# Patient Record
Sex: FEMALE | Race: WHITE | NOT HISPANIC OR LATINO | Employment: OTHER | URBAN - METROPOLITAN AREA
[De-identification: names, ages, dates, MRNs, and addresses within clinical notes are randomized per-mention and may not be internally consistent; named-entity substitution may affect disease eponyms.]

---

## 2017-06-01 ENCOUNTER — OFFICE VISIT - HEALTHEAST (OUTPATIENT)
Dept: ADDICTION MEDICINE | Facility: CLINIC | Age: 32
End: 2017-06-01

## 2017-06-01 DIAGNOSIS — F12.20 CANNABIS USE DISORDER, MODERATE, DEPENDENCE (H): ICD-10-CM

## 2017-06-01 DIAGNOSIS — F10.20 ALCOHOL USE DISORDER, SEVERE, DEPENDENCE (H): ICD-10-CM

## 2017-06-02 ENCOUNTER — AMBULATORY - HEALTHEAST (OUTPATIENT)
Dept: BEHAVIORAL HEALTH | Facility: CLINIC | Age: 32
End: 2017-06-02

## 2017-06-02 DIAGNOSIS — F10.20 SEVERE ALCOHOL USE DISORDER (H): ICD-10-CM

## 2017-06-05 ENCOUNTER — OFFICE VISIT - HEALTHEAST (OUTPATIENT)
Dept: ADDICTION MEDICINE | Facility: CLINIC | Age: 32
End: 2017-06-05

## 2017-06-05 DIAGNOSIS — F10.20 ALCOHOL USE DISORDER, SEVERE, DEPENDENCE (H): ICD-10-CM

## 2017-06-06 ENCOUNTER — OFFICE VISIT - HEALTHEAST (OUTPATIENT)
Dept: ADDICTION MEDICINE | Facility: CLINIC | Age: 32
End: 2017-06-06

## 2017-06-06 DIAGNOSIS — F10.20 ALCOHOL USE DISORDER, SEVERE, DEPENDENCE (H): ICD-10-CM

## 2017-06-07 ENCOUNTER — OFFICE VISIT - HEALTHEAST (OUTPATIENT)
Dept: ADDICTION MEDICINE | Facility: CLINIC | Age: 32
End: 2017-06-07

## 2017-06-07 DIAGNOSIS — F10.20 ALCOHOL USE DISORDER, SEVERE, DEPENDENCE (H): ICD-10-CM

## 2017-06-08 ENCOUNTER — OFFICE VISIT - HEALTHEAST (OUTPATIENT)
Dept: ADDICTION MEDICINE | Facility: CLINIC | Age: 32
End: 2017-06-08

## 2017-06-08 ENCOUNTER — AMBULATORY - HEALTHEAST (OUTPATIENT)
Dept: ADDICTION MEDICINE | Facility: CLINIC | Age: 32
End: 2017-06-08

## 2017-06-08 DIAGNOSIS — F10.20 ALCOHOL USE DISORDER, SEVERE, DEPENDENCE (H): ICD-10-CM

## 2017-06-09 ENCOUNTER — AMBULATORY - HEALTHEAST (OUTPATIENT)
Dept: ADDICTION MEDICINE | Facility: CLINIC | Age: 32
End: 2017-06-09

## 2017-06-09 ENCOUNTER — OFFICE VISIT - HEALTHEAST (OUTPATIENT)
Dept: ADDICTION MEDICINE | Facility: CLINIC | Age: 32
End: 2017-06-09

## 2017-06-09 DIAGNOSIS — F10.20 ALCOHOL USE DISORDER, SEVERE, DEPENDENCE (H): ICD-10-CM

## 2017-06-12 ENCOUNTER — OFFICE VISIT - HEALTHEAST (OUTPATIENT)
Dept: ADDICTION MEDICINE | Facility: CLINIC | Age: 32
End: 2017-06-12

## 2017-06-12 DIAGNOSIS — F10.20 ALCOHOL USE DISORDER, SEVERE, DEPENDENCE (H): ICD-10-CM

## 2017-06-13 ENCOUNTER — OFFICE VISIT - HEALTHEAST (OUTPATIENT)
Dept: ADDICTION MEDICINE | Facility: CLINIC | Age: 32
End: 2017-06-13

## 2017-06-13 DIAGNOSIS — F10.20 ALCOHOL USE DISORDER, SEVERE, DEPENDENCE (H): ICD-10-CM

## 2017-06-14 ENCOUNTER — OFFICE VISIT - HEALTHEAST (OUTPATIENT)
Dept: ADDICTION MEDICINE | Facility: CLINIC | Age: 32
End: 2017-06-14

## 2017-06-14 DIAGNOSIS — F10.20 ALCOHOL USE DISORDER, SEVERE, DEPENDENCE (H): ICD-10-CM

## 2017-06-15 ENCOUNTER — AMBULATORY - HEALTHEAST (OUTPATIENT)
Dept: ADDICTION MEDICINE | Facility: CLINIC | Age: 32
End: 2017-06-15

## 2017-06-15 ENCOUNTER — OFFICE VISIT - HEALTHEAST (OUTPATIENT)
Dept: ADDICTION MEDICINE | Facility: CLINIC | Age: 32
End: 2017-06-15

## 2017-06-15 DIAGNOSIS — F10.20 ALCOHOL USE DISORDER, SEVERE, DEPENDENCE (H): ICD-10-CM

## 2017-06-23 ENCOUNTER — COMMUNICATION - HEALTHEAST (OUTPATIENT)
Dept: ADDICTION MEDICINE | Facility: CLINIC | Age: 32
End: 2017-06-23

## 2017-06-23 ENCOUNTER — AMBULATORY - HEALTHEAST (OUTPATIENT)
Dept: ADDICTION MEDICINE | Facility: CLINIC | Age: 32
End: 2017-06-23

## 2017-06-26 ENCOUNTER — OFFICE VISIT - HEALTHEAST (OUTPATIENT)
Dept: ADDICTION MEDICINE | Facility: CLINIC | Age: 32
End: 2017-06-26

## 2017-06-26 DIAGNOSIS — F10.20 ALCOHOL USE DISORDER, SEVERE, DEPENDENCE (H): ICD-10-CM

## 2017-06-27 ENCOUNTER — OFFICE VISIT - HEALTHEAST (OUTPATIENT)
Dept: ADDICTION MEDICINE | Facility: CLINIC | Age: 32
End: 2017-06-27

## 2017-06-27 DIAGNOSIS — F10.20 ALCOHOL USE DISORDER, SEVERE, DEPENDENCE (H): ICD-10-CM

## 2017-06-30 ENCOUNTER — AMBULATORY - HEALTHEAST (OUTPATIENT)
Dept: ADDICTION MEDICINE | Facility: CLINIC | Age: 32
End: 2017-06-30

## 2017-06-30 ENCOUNTER — OFFICE VISIT - HEALTHEAST (OUTPATIENT)
Dept: ADDICTION MEDICINE | Facility: CLINIC | Age: 32
End: 2017-06-30

## 2017-06-30 DIAGNOSIS — F10.20 ALCOHOL USE DISORDER, SEVERE, DEPENDENCE (H): ICD-10-CM

## 2017-07-05 ENCOUNTER — OFFICE VISIT - HEALTHEAST (OUTPATIENT)
Dept: ADDICTION MEDICINE | Facility: CLINIC | Age: 32
End: 2017-07-05

## 2017-07-05 DIAGNOSIS — F10.20 ALCOHOL USE DISORDER, SEVERE, DEPENDENCE (H): ICD-10-CM

## 2017-07-06 ENCOUNTER — OFFICE VISIT - HEALTHEAST (OUTPATIENT)
Dept: ADDICTION MEDICINE | Facility: CLINIC | Age: 32
End: 2017-07-06

## 2017-07-06 DIAGNOSIS — F10.20 ALCOHOL USE DISORDER, SEVERE, DEPENDENCE (H): ICD-10-CM

## 2017-07-07 ENCOUNTER — AMBULATORY - HEALTHEAST (OUTPATIENT)
Dept: ADDICTION MEDICINE | Facility: CLINIC | Age: 32
End: 2017-07-07

## 2017-07-10 ENCOUNTER — AMBULATORY - HEALTHEAST (OUTPATIENT)
Dept: ADDICTION MEDICINE | Facility: CLINIC | Age: 32
End: 2017-07-10

## 2017-07-10 ENCOUNTER — OFFICE VISIT - HEALTHEAST (OUTPATIENT)
Dept: ADDICTION MEDICINE | Facility: CLINIC | Age: 32
End: 2017-07-10

## 2017-07-10 DIAGNOSIS — F10.20 ALCOHOL USE DISORDER, SEVERE, DEPENDENCE (H): ICD-10-CM

## 2017-07-11 ENCOUNTER — OFFICE VISIT - HEALTHEAST (OUTPATIENT)
Dept: ADDICTION MEDICINE | Facility: CLINIC | Age: 32
End: 2017-07-11

## 2017-07-11 DIAGNOSIS — F10.20 ALCOHOL USE DISORDER, SEVERE, DEPENDENCE (H): ICD-10-CM

## 2017-07-13 ENCOUNTER — COMMUNICATION - HEALTHEAST (OUTPATIENT)
Dept: ADDICTION MEDICINE | Facility: CLINIC | Age: 32
End: 2017-07-13

## 2017-07-14 ENCOUNTER — AMBULATORY - HEALTHEAST (OUTPATIENT)
Dept: ADDICTION MEDICINE | Facility: CLINIC | Age: 32
End: 2017-07-14

## 2017-07-14 ENCOUNTER — OFFICE VISIT - HEALTHEAST (OUTPATIENT)
Dept: ADDICTION MEDICINE | Facility: CLINIC | Age: 32
End: 2017-07-14

## 2017-07-14 DIAGNOSIS — F10.20 ALCOHOL USE DISORDER, SEVERE, DEPENDENCE (H): ICD-10-CM

## 2017-07-17 ENCOUNTER — OFFICE VISIT - HEALTHEAST (OUTPATIENT)
Dept: ADDICTION MEDICINE | Facility: CLINIC | Age: 32
End: 2017-07-17

## 2017-07-17 DIAGNOSIS — F10.20 ALCOHOL USE DISORDER, SEVERE, DEPENDENCE (H): ICD-10-CM

## 2017-07-18 ENCOUNTER — OFFICE VISIT - HEALTHEAST (OUTPATIENT)
Dept: ADDICTION MEDICINE | Facility: CLINIC | Age: 32
End: 2017-07-18

## 2017-07-18 DIAGNOSIS — F10.20 ALCOHOL USE DISORDER, SEVERE, DEPENDENCE (H): ICD-10-CM

## 2017-07-21 ENCOUNTER — OFFICE VISIT - HEALTHEAST (OUTPATIENT)
Dept: ADDICTION MEDICINE | Facility: CLINIC | Age: 32
End: 2017-07-21

## 2017-07-21 DIAGNOSIS — F10.20 ALCOHOL USE DISORDER, SEVERE, DEPENDENCE (H): ICD-10-CM

## 2017-07-23 ENCOUNTER — AMBULATORY - HEALTHEAST (OUTPATIENT)
Dept: ADDICTION MEDICINE | Facility: CLINIC | Age: 32
End: 2017-07-23

## 2017-07-25 ENCOUNTER — OFFICE VISIT - HEALTHEAST (OUTPATIENT)
Dept: ADDICTION MEDICINE | Facility: CLINIC | Age: 32
End: 2017-07-25

## 2017-07-25 DIAGNOSIS — F10.20 ALCOHOL USE DISORDER, SEVERE, DEPENDENCE (H): ICD-10-CM

## 2017-07-30 ENCOUNTER — AMBULATORY - HEALTHEAST (OUTPATIENT)
Dept: ADDICTION MEDICINE | Facility: CLINIC | Age: 32
End: 2017-07-30

## 2017-07-31 ENCOUNTER — AMBULATORY - HEALTHEAST (OUTPATIENT)
Dept: ADDICTION MEDICINE | Facility: CLINIC | Age: 32
End: 2017-07-31

## 2017-07-31 ENCOUNTER — OFFICE VISIT - HEALTHEAST (OUTPATIENT)
Dept: ADDICTION MEDICINE | Facility: CLINIC | Age: 32
End: 2017-07-31

## 2017-07-31 DIAGNOSIS — F10.20 ALCOHOL USE DISORDER, SEVERE, DEPENDENCE (H): ICD-10-CM

## 2017-08-01 ENCOUNTER — OFFICE VISIT - HEALTHEAST (OUTPATIENT)
Dept: ADDICTION MEDICINE | Facility: CLINIC | Age: 32
End: 2017-08-01

## 2017-08-01 DIAGNOSIS — F10.20 ALCOHOL USE DISORDER, SEVERE, DEPENDENCE (H): ICD-10-CM

## 2017-08-04 ENCOUNTER — OFFICE VISIT - HEALTHEAST (OUTPATIENT)
Dept: ADDICTION MEDICINE | Facility: CLINIC | Age: 32
End: 2017-08-04

## 2017-08-04 DIAGNOSIS — F10.20 ALCOHOL USE DISORDER, SEVERE, DEPENDENCE (H): ICD-10-CM

## 2017-08-06 ENCOUNTER — AMBULATORY - HEALTHEAST (OUTPATIENT)
Dept: ADDICTION MEDICINE | Facility: CLINIC | Age: 32
End: 2017-08-06

## 2017-08-08 ENCOUNTER — COMMUNICATION - HEALTHEAST (OUTPATIENT)
Dept: ADDICTION MEDICINE | Facility: CLINIC | Age: 32
End: 2017-08-08

## 2017-08-11 ENCOUNTER — OFFICE VISIT - HEALTHEAST (OUTPATIENT)
Dept: ADDICTION MEDICINE | Facility: CLINIC | Age: 32
End: 2017-08-11

## 2017-08-11 DIAGNOSIS — F10.20 ALCOHOL USE DISORDER, SEVERE, DEPENDENCE (H): ICD-10-CM

## 2017-08-13 ENCOUNTER — AMBULATORY - HEALTHEAST (OUTPATIENT)
Dept: ADDICTION MEDICINE | Facility: CLINIC | Age: 32
End: 2017-08-13

## 2017-08-14 ENCOUNTER — COMMUNICATION - HEALTHEAST (OUTPATIENT)
Dept: ADDICTION MEDICINE | Facility: CLINIC | Age: 32
End: 2017-08-14

## 2017-08-15 ENCOUNTER — OFFICE VISIT - HEALTHEAST (OUTPATIENT)
Dept: ADDICTION MEDICINE | Facility: CLINIC | Age: 32
End: 2017-08-15

## 2017-08-15 DIAGNOSIS — F10.20 ALCOHOL USE DISORDER, SEVERE, DEPENDENCE (H): ICD-10-CM

## 2017-08-17 ENCOUNTER — AMBULATORY - HEALTHEAST (OUTPATIENT)
Dept: ADDICTION MEDICINE | Facility: CLINIC | Age: 32
End: 2017-08-17

## 2017-08-22 ENCOUNTER — OFFICE VISIT - HEALTHEAST (OUTPATIENT)
Dept: ADDICTION MEDICINE | Facility: CLINIC | Age: 32
End: 2017-08-22

## 2017-08-22 DIAGNOSIS — F10.20 ALCOHOL USE DISORDER, SEVERE, DEPENDENCE (H): ICD-10-CM

## 2017-08-25 ENCOUNTER — AMBULATORY - HEALTHEAST (OUTPATIENT)
Dept: ADDICTION MEDICINE | Facility: CLINIC | Age: 32
End: 2017-08-25

## 2017-08-29 ENCOUNTER — COMMUNICATION - HEALTHEAST (OUTPATIENT)
Dept: ADDICTION MEDICINE | Facility: CLINIC | Age: 32
End: 2017-08-29

## 2017-09-01 ENCOUNTER — AMBULATORY - HEALTHEAST (OUTPATIENT)
Dept: ADDICTION MEDICINE | Facility: CLINIC | Age: 32
End: 2017-09-01

## 2017-09-01 ENCOUNTER — COMMUNICATION - HEALTHEAST (OUTPATIENT)
Dept: ADDICTION MEDICINE | Facility: CLINIC | Age: 32
End: 2017-09-01

## 2017-09-05 ENCOUNTER — COMMUNICATION - HEALTHEAST (OUTPATIENT)
Dept: ADDICTION MEDICINE | Facility: CLINIC | Age: 32
End: 2017-09-05

## 2017-09-07 ENCOUNTER — OFFICE VISIT - HEALTHEAST (OUTPATIENT)
Dept: ADDICTION MEDICINE | Facility: CLINIC | Age: 32
End: 2017-09-07

## 2017-09-07 DIAGNOSIS — F10.20 ALCOHOL USE DISORDER, SEVERE, DEPENDENCE (H): ICD-10-CM

## 2017-09-08 ENCOUNTER — AMBULATORY - HEALTHEAST (OUTPATIENT)
Dept: ADDICTION MEDICINE | Facility: CLINIC | Age: 32
End: 2017-09-08

## 2017-09-12 ENCOUNTER — COMMUNICATION - HEALTHEAST (OUTPATIENT)
Dept: ADDICTION MEDICINE | Facility: CLINIC | Age: 32
End: 2017-09-12

## 2017-09-14 ENCOUNTER — AMBULATORY - HEALTHEAST (OUTPATIENT)
Dept: ADDICTION MEDICINE | Facility: CLINIC | Age: 32
End: 2017-09-14

## 2017-09-23 ENCOUNTER — AMBULATORY - HEALTHEAST (OUTPATIENT)
Dept: ADDICTION MEDICINE | Facility: CLINIC | Age: 32
End: 2017-09-23

## 2017-10-02 ENCOUNTER — AMBULATORY - HEALTHEAST (OUTPATIENT)
Dept: ADDICTION MEDICINE | Facility: CLINIC | Age: 32
End: 2017-10-02

## 2018-05-04 ENCOUNTER — HOSPITAL ENCOUNTER (EMERGENCY)
Facility: CLINIC | Age: 33
Discharge: HOME | End: 2018-05-04
Attending: EMERGENCY MEDICINE | Admitting: EMERGENCY MEDICINE
Payer: COMMERCIAL

## 2018-05-04 VITALS
TEMPERATURE: 98.7 F | DIASTOLIC BLOOD PRESSURE: 75 MMHG | OXYGEN SATURATION: 98 % | SYSTOLIC BLOOD PRESSURE: 118 MMHG | HEART RATE: 98 BPM | RESPIRATION RATE: 20 BRPM

## 2018-05-04 DIAGNOSIS — O03.4 INCOMPLETE MISCARRIAGE: ICD-10-CM

## 2018-05-04 LAB
ABO + RH BLD: NORMAL
ABO + RH BLD: NORMAL
B-HCG SERPL-ACNC: 7125 IU/L (ref 0–5)
BASOPHILS # BLD AUTO: 0 10E9/L (ref 0–0.2)
BASOPHILS NFR BLD AUTO: 0.1 %
BLOOD BANK CMNT PATIENT-IMP: NORMAL
DIFFERENTIAL METHOD BLD: ABNORMAL
EOSINOPHIL # BLD AUTO: 0.1 10E9/L (ref 0–0.7)
EOSINOPHIL NFR BLD AUTO: 0.9 %
ERYTHROCYTE [DISTWIDTH] IN BLOOD BY AUTOMATED COUNT: 11.3 % (ref 10–15)
HCT VFR BLD AUTO: 37.9 % (ref 35–47)
HGB BLD-MCNC: 13.1 G/DL (ref 11.7–15.7)
IMM GRANULOCYTES # BLD: 0 10E9/L (ref 0–0.4)
IMM GRANULOCYTES NFR BLD: 0.1 %
LYMPHOCYTES # BLD AUTO: 1.3 10E9/L (ref 0.8–5.3)
LYMPHOCYTES NFR BLD AUTO: 15.5 %
MCH RBC QN AUTO: 33.4 PG (ref 26.5–33)
MCHC RBC AUTO-ENTMCNC: 34.6 G/DL (ref 31.5–36.5)
MCV RBC AUTO: 97 FL (ref 78–100)
MONOCYTES # BLD AUTO: 0.7 10E9/L (ref 0–1.3)
MONOCYTES NFR BLD AUTO: 8.4 %
NEUTROPHILS # BLD AUTO: 6.1 10E9/L (ref 1.6–8.3)
NEUTROPHILS NFR BLD AUTO: 75 %
NRBC # BLD AUTO: 0 10*3/UL
NRBC BLD AUTO-RTO: 0 /100
PLATELET # BLD AUTO: 276 10E9/L (ref 150–450)
RBC # BLD AUTO: 3.92 10E12/L (ref 3.8–5.2)
SPECIMEN EXP DATE BLD: NORMAL
WBC # BLD AUTO: 8.1 10E9/L (ref 4–11)

## 2018-05-04 PROCEDURE — 99285 EMERGENCY DEPT VISIT HI MDM: CPT | Mod: 25

## 2018-05-04 PROCEDURE — 25000128 H RX IP 250 OP 636: Performed by: EMERGENCY MEDICINE

## 2018-05-04 PROCEDURE — 96376 TX/PRO/DX INJ SAME DRUG ADON: CPT

## 2018-05-04 PROCEDURE — 96374 THER/PROPH/DIAG INJ IV PUSH: CPT

## 2018-05-04 PROCEDURE — 86901 BLOOD TYPING SEROLOGIC RH(D): CPT | Performed by: EMERGENCY MEDICINE

## 2018-05-04 PROCEDURE — 25000132 ZZH RX MED GY IP 250 OP 250 PS 637: Performed by: EMERGENCY MEDICINE

## 2018-05-04 PROCEDURE — 84702 CHORIONIC GONADOTROPIN TEST: CPT | Performed by: EMERGENCY MEDICINE

## 2018-05-04 PROCEDURE — 96375 TX/PRO/DX INJ NEW DRUG ADDON: CPT

## 2018-05-04 PROCEDURE — 85025 COMPLETE CBC W/AUTO DIFF WBC: CPT | Performed by: EMERGENCY MEDICINE

## 2018-05-04 RX ORDER — HYDROCODONE BITARTRATE AND ACETAMINOPHEN 5; 325 MG/1; MG/1
1 TABLET ORAL EVERY 6 HOURS PRN
Qty: 12 TABLET | Refills: 0 | Status: SHIPPED | OUTPATIENT
Start: 2018-05-04 | End: 2021-04-14

## 2018-05-04 RX ORDER — KETOROLAC TROMETHAMINE 30 MG/ML
15 INJECTION, SOLUTION INTRAMUSCULAR; INTRAVENOUS ONCE
Status: COMPLETED | OUTPATIENT
Start: 2018-05-04 | End: 2018-05-04

## 2018-05-04 RX ORDER — HYDROCODONE BITARTRATE AND ACETAMINOPHEN 5; 325 MG/1; MG/1
2 TABLET ORAL ONCE
Status: COMPLETED | OUTPATIENT
Start: 2018-05-04 | End: 2018-05-04

## 2018-05-04 RX ORDER — MORPHINE SULFATE 4 MG/ML
4 INJECTION, SOLUTION INTRAMUSCULAR; INTRAVENOUS ONCE
Status: COMPLETED | OUTPATIENT
Start: 2018-05-04 | End: 2018-05-04

## 2018-05-04 RX ORDER — MORPHINE SULFATE 4 MG/ML
4 INJECTION, SOLUTION INTRAMUSCULAR; INTRAVENOUS ONCE
Status: DISCONTINUED | OUTPATIENT
Start: 2018-05-04 | End: 2018-05-04 | Stop reason: HOSPADM

## 2018-05-04 RX ORDER — MELOXICAM 15 MG/1
15 TABLET ORAL DAILY
Qty: 14 TABLET | Refills: 0 | Status: SHIPPED | OUTPATIENT
Start: 2018-05-04 | End: 2021-04-14

## 2018-05-04 RX ADMIN — MORPHINE SULFATE 4 MG: 4 INJECTION, SOLUTION INTRAMUSCULAR; INTRAVENOUS at 10:19

## 2018-05-04 RX ADMIN — HYDROCODONE BITARTRATE AND ACETAMINOPHEN 1 TABLET: 5; 325 TABLET ORAL at 11:52

## 2018-05-04 RX ADMIN — MORPHINE SULFATE 4 MG: 4 INJECTION INTRAVENOUS at 09:46

## 2018-05-04 RX ADMIN — KETOROLAC TROMETHAMINE 15 MG: 30 INJECTION, SOLUTION INTRAMUSCULAR at 09:45

## 2018-05-04 ASSESSMENT — ENCOUNTER SYMPTOMS: ABDOMINAL PAIN: 1

## 2018-05-04 NOTE — ED AVS SNAPSHOT
Emergency Department    [address] [facility] [address]    Phone:  [phone]    Fax:  [phone]                                       Karin Cardoza   MRN: [id number]    Department:   Emergency Department   Date of Visit:  5/4/2018           Patient Information     Date Of Birth          1985        Your diagnoses for this visit were:     Incomplete miscarriage        You were seen by Andres Hennessy MD.      Follow-up Information     Follow up with Mayur Bryson MD.    Specialty:  Internal Medicine    Why:  As needed, If symptoms worsen    Contact information:    [facility] [address] [place] [id number] [phone]          Follow up with  Emergency Department.    Specialty:  EMERGENCY MEDICINE    Why:  As needed, If symptoms worsen    Contact information:    [address] [facility] [id number] [phone]        Discharge Instructions         Incomplete Miscarriage  Today's exams show your pregnancy has ended suddenly. This can be emotionally difficult. There is little that can be done to change the way you feel. But understand that miscarriages are common.  About 1 or 2 out of every 10 pregnancies end this way. Some even end before you know you are pregnant. This happens for a number of reasons, and usually the cause is never known. It s important you know that it is not your fault. It didn t happen because you did anything wrong.  Having sex or exercising does not cause a miscarriage. These activities are usually safe unless you have pain or bleeding or your doctor tells you to stop. Even minor falls won t cause a miscarriage. Miscarriages happen because things were not developing as they were supposed to. No medicine can prevent a miscarriage. After you have recovered, you should still be able to get pregnant again. But before trying, talk with your healthcare provider.  It appears that your miscarriage is not yet complete. Some  tissue from the pregnancy is in the uterus. You will probably have more cramping and bleeding for the next few days as the tissue leaves your uterus. Usually all of the tissue will pass out by itself. But sometimes tissue remains. In that case, it must be removed to stop bleeding and prevent infection.  Home care  Follow these tips to take of yourself at home:    You can go back to your normal activities if you don t have heavy bleeding or pain.    You may have some cramping and bleeding, but it shouldn t be severe.  Until the bleeding stops completely and to prevent infection:    Don t have sex until your healthcare provider says it s OK.    Use sanitary napkins instead of tampons.    Don t douche.  Having a miscarriage can be very difficult emotionally. It is natural to feel sadness or grief. It may help to talk about your feelings with family and friends, or with a counselor.  Follow-up care  You may pass fetal tissue. If you see anything, it may appear as a 1-inch or larger piece of gray or pink flesh. If fetal tissue has not passed from your vagina within the next 5 days, you need to see your healthcare provider for another exam. To prevent infection in the uterus, your provider might need to take out the tissue by surgery. Or you may be given medicine to take at home to help your body expel the rest of the tissue.  If you had an ultrasound, a radiologist will review it. You will be told of any new findings that may affect your care.  Call 911  Call 911 if you have:    Severe pain and very heavy bleeding    Severe lightheadedness, passing out, or fainting    Rapid heart rate    Difficulty breathing    Confusion or difficulty waking up  When to seek medical advice  Call your healthcare provider right away if any of these occur:    Heavy bleeding. This means soaking 1 new pad an hour over 3 hours.    Foul-smelling vaginal discharge    Fever of 100.4 F (38 C) or higher, or as directed by your healthcare  provider    Pain in your lower belly (abdomen) that gets worse    Weakness or dizziness  Date Last Reviewed: 9/1/2016 2000-2017 [employer]. [address] [place] [address], [place] [address]. All rights reserved. This information is not intended as a substitute for professional medical care. Always follow your healthcare professional's instructions.          Understanding Miscarriage: Emotions  Miscarriage is the unplanned end of a pregnancy that happens before you reach 20 weeks. When a miscarriage happens, you re likely to have a wide range of feelings. Allow yourself to accept how you feel. Only then can you begin to move on.    No one is to blame  Know that you did not cause this to happen. Miscarriage is very common. There is a 15% chance of miscarriage with each pregnancy (after pregnancy has been diagnosed). Miscarriage usually takes place during the first 10 weeks after conception.  Grief takes many forms  Grief may be the first thing you feel, or it may come upon you later. Perhaps you ll grieve because the future you hoped for is lost. Grief is painful and often lonely. But your miscarriage should become easier to deal with over time.  What you feel is OK  No one can tell you how to respond to your miscarriage. If you have been trying to have a child, this loss may feel overwhelming. Perhaps this was an unplanned pregnancy. That doesn t mean you won t feel loss. You know yourself best. It s OK to feel whatever you feel.  A sense of loss  No matter what you thought about being pregnant, having a miscarriage may cause a sense of loss. You may feel as if something is missing. It s OK if you can t describe how you feel. At first, it may be enough just to look inside yourself and feel your emotions.  Partner s note  Men grieve, too. You may be feeling sad, helpless or frustrated. When you re struggling with your own feelings, knowing how to help your partner may be hard. But do your best to provide  support. The following tips may also help:    Be kind to yourself and your partner.    Spend time together.    Fix a meal or bring dinner home for her.    Rent a movie.    If you have children, spend extra time with them.   Date Last Reviewed: 6/1/2016 2000-2017 [employer]. [address] [place] [address], [place] [address]. All rights reserved. This information is not intended as a substitute for professional medical care. Always follow your healthcare professional's instructions.          Discharge Instructions for Miscarriage  You have had a miscarriage. This is the unplanned end of a pregnancy before the baby is able to live outside the womb. You may have experienced a shock to your system, both physically and emotionally. Because of this, you may not feel well for a few days. Your body is going through changes, and you can expect mood swings. When you are ready, start back to your normal routine.  Home care  Suggestions for care at home include:    Return to work or your daily routines when you feel ready. This might be right away, or you may want to wait a few days.    Take showers instead of tub baths. This helps prevent infection. Ask your healthcare provider when you can take baths again.    Avoid strenuous exercise, such as aerobics or running, until the bleeding slows to the rate of a normal period.    Don t have sexual intercourse or use tampons or douches until your healthcare provider says it s OK.    Get emotional support. Ask your healthcare provider about support groups in your area. Many women find it helpful to talk to other women who have had a miscarriage.  Follow-up  Make a follow-up appointment with your healthcare provider.  When to call your healthcare provider  Call your healthcare provider right away if you have any of the following:    Fever above 100.4 F (38 C) or chills    Bright red vaginal bleeding or a smelly discharge    Vaginal bleeding that soaks more than one menstrual  pad per hour    Belly pain that is severe or getting worse   Date Last Reviewed: 6/1/2016 2000-2017 [employer]. [address] [place] [address], [place] [address]. All rights reserved. This information is not intended as a substitute for professional medical care. Always follow your healthcare professional's instructions.          Understanding Miscarriage: During a Miscarriage  No two miscarriages are alike. Because of this, your healthcare provider will talk with you about the most appropriate treatment for you. If you re in good health and early in the pregnancy, your body may expel all the pregnancy tissue on its own. If your body doesn t expel all the tissue, your healthcare provider may recommend treatment to prevent infection and severe bleeding (hemorrhaging).  What happens during miscarriage  Some miscarriages happen without any signs or symptoms. Most miscarriages, however, start with bleeding and cramping, which may increase over time. The cramps may get very strong. This is normal when a miscarriage is happening. Cramping widens the passage (cervix) that tissue from the uterus must pass through to leave your body. Your healthcare provider may ask you for a sample of the tissue for lab testing. This is to make sure that the cells being shed from your body are normal.  Diagnosis  To confirm the miscarriage, your healthcare provider will do a pelvic exam. Your healthcare provider might order a blood test to measure the levels of a pregnancy hormone (hCG).  He or she may also have you get an ultrasound test to find out if all of the tissue has passed from the uterus. If a miscarriage happens very early in the pregnancy, an ultrasound is not needed.  Treatment  If any tissue remains in the uterus, your healthcare provider may suggest the following measures depending on your particular situation:    Medicine. This is prescribed for you to take at home. The medicine causes the uterus to expel any  remaining tissue. Take the medicine exactly as directed.    Dilation and curettage (D & C). This procedure is done in your healthcare provider s office or at the hospital. You are given medicine to prevent pain or to allow you to relax or sleep during the procedure. The healthcare provider uses instruments to widen the cervix (dilate). Tissue and blood that line the uterus are then removed (curettage).  Be sure you talk to your healthcare provider about the risks and benefits of these treatments.  If you have Rh-negative blood  If your blood is Rh negative, you may need treatment with Rho(D) immune globulin. This injection prevents substances in your blood from attacking the baby s blood during a future pregnancy. Your healthcare provider can tell you more.   Follow-up care  Keep all follow-up appointments. These are needed to make sure that you are healing well. During these visits, mention if you re feeling very sad or depressed. Your healthcare provider can suggest counseling or other resources to help you.  When to seek medical care  Contact your healthcare provider if you have any of the following:    Severe pain in the stomach, pelvis, or low back    Vaginal discharge that has a bad odor    Bleeding that soaks a new sanitary pad each hour    Fever of 100.4 F (38 C) or higher, or as directed by your healthcare provider   Date Last Reviewed: 6/1/2016 2000-2017 [employer]. [address] [place] [address]. All rights reserved. This information is not intended as a substitute for professional medical care. Always follow your healthcare professional's instructions.      Your appointment with OB/GYN for next week  You will need a follow-up quantitative beta hCG next week.  Pain medications as directed.  There is a marked increase in pain or bleeding, greater than 1 pad per hour, return to the emergency department or call your OB/GYN physician    24 Hour Appointment Hotline       To make an  appointment at any [facility], call [phone] ([phone]). If you don't have a family doctor or clinic, we will help you find one. [facility] are conveniently located to serve the needs of you and your family.             Review of your medicines      START taking        Dose / Directions Last dose taken    HYDROcodone-acetaminophen 5-325 MG per tablet   Commonly known as:  NORCO   Dose:  1 tablet   Quantity:  12 tablet        Take 1 tablet by mouth every 6 hours as needed for pain   Refills:  0        meloxicam 15 MG tablet   Commonly known as:  MOBIC   Dose:  15 mg   Quantity:  14 tablet        Take 1 tablet (15 mg) by mouth daily for 14 days   Refills:  0                Information about OPIOIDS     PRESCRIPTION OPIOIDS: WHAT YOU NEED TO KNOW   You have a prescription for an opioid (narcotic) pain medicine. Opioids can cause addiction. If you have a history of chemical dependency of any type, you are at a higher risk of becoming addicted to opioids. Only take this medicine after all other options have been tried. Take it for as short a time and as few doses as possible.     Do not:    Drive. If you drive while taking these medicines, you could be arrested for driving under the influence (DUI).    Operate heavy machinery    Do any other dangerous activities while taking these medicines.     Drink any alcohol while taking these medicines.      Take with any other medicines that contain acetaminophen. Read all labels carefully. Look for the word  acetaminophen  or  Tylenol.  Ask your pharmacist if you have questions or are unsure.    Store your pills in a secure place, locked if possible. We will not replace any lost or stolen medicine. If you don t finish your medicine, please throw away (dispose) as directed by your pharmacist. The [place] Pollution [employer] Agency has more information about safe disposal: https://www.pca.[facility].[place]/living-green/managing-unwanted-medications    All opioids  tend to cause constipation. Drink plenty of water and eat foods that have a lot of fiber, such as fruits, vegetables, prune juice, apple juice and high-fiber cereal. Take a laxative (Miralax, milk of magnesia, Colace, Senna) if you don t move your bowels at least every other day.         Prescriptions were sent or printed at these locations (2 Prescriptions)                   Other Prescriptions                Printed at Department/Unit printer (2 of 2)         HYDROcodone-acetaminophen (NORCO) 5-325 MG per tablet               meloxicam (MOBIC) 15 MG tablet                Procedures and tests performed during your visit     CBC with platelets differential    HCG quantitative pregnancy (blood)    Rh type      Orders Needing Specimen Collection     None      Pending Results     No orders found from 5/2/2018 to 5/5/2018.            Pending Culture Results     No orders found from 5/2/2018 to 5/5/2018.            Pending Results Instructions     If you had any lab results that were not finalized at the time of your Discharge, you can call the ED Lab Result RN at [phone]. You will be contacted by this team for any positive Lab results or changes in treatment. The nurses are available 7 days a week from 10A to 6:30P.  You can leave a message 24 hours per day and they will return your call.        Test Results From Your Hospital Stay        5/4/2018  9:48 AM      Component Results     Component Value Ref Range & Units Status    WBC 8.1 4.0 - 11.0 10e9/L Final    RBC Count 3.92 3.8 - 5.2 10e12/L Final    Hemoglobin 13.1 11.7 - 15.7 g/dL Final    Hematocrit 37.9 35.0 - 47.0 % Final    MCV 97 78 - 100 fl Final    MCH 33.4 (H) 26.5 - 33.0 pg Final    MCHC 34.6 31.5 - 36.5 g/dL Final    RDW 11.3 10.0 - 15.0 % Final    Platelet Count 276 150 - 450 10e9/L Final    Diff Method Automated Method  Final    % Neutrophils 75.0 % Final    % Lymphocytes 15.5 % Final    % Monocytes 8.4 % Final    % Eosinophils 0.9 % Final    %  Basophils 0.1 % Final    % Immature Granulocytes 0.1 % Final    Nucleated RBCs 0 0 /100 Final    Absolute Neutrophil 6.1 1.6 - 8.3 10e9/L Final    Absolute Lymphocytes 1.3 0.8 - 5.3 10e9/L Final    Absolute Monocytes 0.7 0.0 - 1.3 10e9/L Final    Absolute Eosinophils 0.1 0.0 - 0.7 10e9/L Final    Absolute Basophils 0.0 0.0 - 0.2 10e9/L Final    Abs Immature Granulocytes 0.0 0 - 0.4 10e9/L Final    Absolute Nucleated RBC 0.0  Final         5/4/2018 10:35 AM      Component Results     Component    ABO    B    RH(D)    Pos    Specimen Expires    05/07/2018    Blood Bank Comment    This specimen was not labeled according to requirements for establishing a blood type for   transfusion purposes.  The patient can be transfused with type O red cells until a new   specimen is obtained to confirm the patient's blood type, at which time type-specific   blood can be transfused.           5/4/2018 10:22 AM      Component Results     Component Value Ref Range & Units Status    HCG Quantitative Serum 7125 (H) 0 - 5 IU/L Final                Clinical Quality Measure: Blood Pressure Screening     Your blood pressure was checked while you were in the emergency department today. The last reading we obtained was  BP: 118/75 . Please read the guidelines below about what these numbers mean and what you should do about them.  If your systolic blood pressure (the top number) is less than 120 and your diastolic blood pressure (the bottom number) is less than 80, then your blood pressure is normal. There is nothing more that you need to do about it.  If your systolic blood pressure (the top number) is 120-139 or your diastolic blood pressure (the bottom number) is 80-89, your blood pressure may be higher than it should be. You should have your blood pressure rechecked within a year by a primary care provider.  If your systolic blood pressure (the top number) is 140 or greater or your diastolic blood pressure (the bottom number) is 90 or  "greater, you may have high blood pressure. High blood pressure is treatable, but if left untreated over time it can put you at risk for heart attack, stroke, or kidney failure. You should have your blood pressure rechecked by a primary care provider within the next 4 weeks.  If your provider in the emergency department today gave you specific instructions to follow-up with your doctor or provider even sooner than that, you should follow that instruction and not wait for up to 4 weeks for your follow-up visit.        Thank you for choosing [facility]       Thank you for choosing [facility] for your care. Our goal is always to provide you with excellent care. Hearing back from our patients is one way we can continue to improve our services. Please take a few minutes to complete the written survey that you may receive in the mail after you visit with us. Thank you!        [employer] Information     [employer] lets you send messages to your doctor, view your test results, renew your prescriptions, schedule appointments and more. To sign up, go to [email or web address] . Click on \"Log in\" on the left side of the screen, which will take you to the Welcome page. Then click on \"Sign up Now\" on the right side of the page.     You will be asked to enter the access code listed below, as well as some personal information. Please follow the directions to create your username and password.     Your access code is: [email or web address]  Expires: 2018 11:27 AM     Your access code will  in 90 days. If you need help or a new code, please call your [facility] clinic or [phone].        Care EveryWhere ID     This is your Care EveryWhere ID. This could be used by other organizations to access your [facility] medical records  [id number]        Equal Access to Services     MIGEL BARRIOS : Waleska Benavidez, chanel dacosta, theresa felipe. So [facility] [phone].    ATENCIÓN: Si " habla ayo, tiene a jules disposición servicios gratuitos de asistencia lingüística. Llame al [phone].    We comply with applicable federal civil rights laws and [place] laws. We do not discriminate on the basis of race, color, national origin, age, disability, sex, sexual orientation, or gender identity.            After Visit Summary       This is your record. Keep this with you and show to your community pharmacist(s) and doctor(s) at your next visit.

## 2018-05-04 NOTE — ED PROVIDER NOTES
History     Chief Complaint:  Vaginal Bleeding    HPI   Karin Cardoza is a  33 year old female previously told she is having a current miscarriage earlier this week who presents to the emergency department today for evaluation of vaginal bleeding. Per chart review, the patient has been seen three times in the past three for vaginal bleeding at [facility]. On , she went to [facility] with a chief complaint of lower abdominal pain. She had an ultrasound performed that showed a left ovarian cyst, but no intrauterine pregnancy, but had an hCG quantitative of 53. On , she visited [facility] again for lower abdominal pain and vaginal bleeding. Labs were obtained including UA, CBC, wet prep, and hCG quantitative notable at 2249 and ultrasound revealed a gestational sac without a yolk sac as noted below. Miscarriage was not ruled out at that time, but she was not diagnosed with a miscarriage and was told to follow up with OB/GYN in one week. Yesterday, she was seen at [facility] again for increased bleeding. Labs were obtained as noted below remarkable for a quantitative hCG at 9030. She also had an ultrasound performed as noted below. Since her discharge, the patient reports that she has been experiencing increasing vaginal bleeding and spotting for the past five days. This morning around 0300, she woke up with worsening spotting with passing clots and vaginal cramping. She was in the bathroom all morning and then around 0630, she started to experience worse pain rated at 10/10. She was concerned about the worsening pain prompting her visit to the emergency department. She denies taking any medication for the current pain. Of note, in  she had a previous miscarriage and when she was 19, she had an elective . Additionally, her last period was .    Labs at [facility] (5/3/18)  CBC: WNL (WBC 6.1, HGB 13.5, )  HCG Quantitative: 9030 (H)    Ultrasound OB <14 weeks (5/3/18)  CONCLUSION:     1.  9 mm  cystic focus in the endometrial cavity which could represent a very early intrauterine pregnancy without fetal pole or yolk sac is yet seen. This correlates with 5 weeks and 4 days gestational age by mean sac diameter and increased in size from 02/24/2018. Correlation with serial beta hCG is suggested.    2.  Normal appearance of ovaries without adnexal mass or pelvic fluid collection seen.    Report per radiology    Allergies:  Pertussis Vaccines    Sulfa Antibiotics    Medications:    Prenatal Vit-Fe Fumarate-FA (PRENATAL RX 1 OR)      Past Medical History:    Abnormal Pap      PID (acute pelvic inflammatory disease)      Anxiety and depression    Alcohol abuse, in remission  Acute pancreatitis   Endometriosis  Bipolar 1 disorder    Past Surgical History:    History reviewed. No pertinent past surgical history.    Family History:    Diabetes    Social History:  The patient was alone.  Smoking Status: Former  Smokeless Tobacco: Never  Alcohol Use: No    Review of Systems   Gastrointestinal: Positive for abdominal pain.   Genitourinary: Positive for vaginal bleeding and vaginal pain.   All other systems reviewed and are negative.    Physical Exam     Patient Vitals for the past 24 hrs:   BP Temp Temp src Pulse Heart Rate Resp SpO2   05/04/18 0919 (!) 138/106 98.7  F (37.1  C) Oral 98 98 20 100 %         Physical Exam  General: Resting uncomfortably on the gurney, states that her pain is 10/10  Head:  The scalp, face, and head appear normal  Eyes:  The pupils are equal, round, and reactive to light    There is no nystagmus    Extraocular muscles are intact    Conjunctivae and sclerae are normal  ENT:    The nose is normal    Pinnae are normal    External acoustic canals are normal    Tympanic membranes are normal    The oropharynx is normal    Uvula is in the midline  Neck:  Normal range of motion    There is no rigidity noted    There is no midline cervical spine pain/tenderness    Trachea is in the midline    No  mass is detected  CV:  Regular rate and underlying rhythm     Normal S1 and S2    No S3 or S4    No pathological murmur detected  Resp:  Lungs are clear    There is no tachypnea    Non-labored    No rales    No wheezing   GI:  Abdomen is soft, there is no rigidity    No distension    No tympani    No rebound tenderness     There is mild suprapubic tenderness to palpation    Non-surgical without peritoneal features  :  Normal female external genitalia.      Cervix is closed      Uterus is small and anteverted.     Mild blood in the vagina.     Adnexa are normal. No adnexal masses appreciated.   MS:  Normal muscular tone    Symmetric motor strength    No major joint effusions    No asymmetric swelling    No calf tenderness  Skin:  No rash or acute skin lesions noted  Neuro: Speech is normal and fluent  Psych:  Awake. Alert.  Normal affect.  Appropriate interactions.  Lymph: No anterior or posterior cervical lymphadenopathy noted    Emergency Department Course   Laboratory:  Laboratory findings were communicated with the patient who voiced understanding of the findings.  CBC: WNL. (WBC 8.1, HGB 13.1, )   HCG Quantitative: 7125 (H)  Blood type: B+    Interventions:  0945 Toradol 15 mg IV  0946 morphine 4 mg IV  1019 morphine 4 mg IV    Emergency Department Course:  Nursing notes and vitals reviewed.  0916: I performed an exam of the patient as documented above.   1000: Patient rechecked and updated. I performed a pelvic exam with female chaperone as noted above.   1117: Patient rechecked and updated.   Findings and plan explained to the Patient. Patient discharged home with instructions regarding supportive care, medications, and reasons to return. The importance of close follow-up was reviewed. The patient was prescribed Norco and Meloxicam.   I personally reviewed the laboratory results with the Patient and answered all related questions prior to discharge.      Suicide screen.  I have performed an in person  assessment of the patient. Based on this assessment the patient no longer requires a one on one attendant at this point in time.    The patient had the [place] risk suicide screening tool employed, as this is being done on all patients.  She did have a history of suicidal thoughts, remotely.  Will be offered an outpatient therapy appointment.      Andres Hennessy MD  11:45 AM  May 4, 2018          Impression & Plan    Medical Decision Making:  This patient presents with pelvic cramping and bleeding as noted above.  The patient is noted to have a declining quantitative beta-hCG.  She was given pain medications in the emergency department that helped control her symptoms.  She is likely continuing to progress with a miscarriage.  It is still incomplete at this time as her cervix is closed.  The possibility that she completed overnight and now her cervix is closed and that she is complete is also in the differential diagnosis although less likely.  I suspect she will continue to have miscarriage over the next 1-2 days.  The patient has a follow-up appointment for next week with OB/GYN, she should have her quantitative beta hCG reassessed to make sure that there are no ongoing retainment of products of conception.  She has had 2 miscarriages she also wishes to discuss whether or not any additional blood work testing may be indicated before trying to get pregnant again.  Ectopic pregnancy is in the differential diagnosis although less likely since there was a probable small gestational sac seen in the endometrium.  No fetal pole embryo or yolk sac is ever been seen with this pregnancy which raises the possibility of a blighted ovum.  The patient will be sent home with discharge instructions, I reviewed with her in detail what should prompt return in the interim over the weekend while she waits to see her outpatient specialist.    Diagnosis:    ICD-10-CM    1. Incomplete miscarriage O03.4        Disposition:  discharged  to home    Discharge Medications:  New Prescriptions    HYDROCODONE-ACETAMINOPHEN (NORCO) 5-325 MG PER TABLET    Take 1 tablet by mouth every 6 hours as needed for pain    MELOXICAM (MOBIC) 15 MG TABLET    Take 1 tablet (15 mg) by mouth daily for 14 days         Scribe Disclosure:  Taye ARIAS, am serving as a scribe at 9:16 AM on 5/4/2018 to document services personally performed by Andres Hennessy MD based on my observations and the provider's statements to me.     5/4/2018    EMERGENCY DEPARTMENT       Andres Hennessy MD  05/04/18 1557

## 2018-05-04 NOTE — ED NOTES
Pt suicide assessment was orange, MD notified and assessment required no 1:1 monitoring. Pt was offered resources but declined as she has therapist that she currently sees

## 2018-05-04 NOTE — DISCHARGE INSTRUCTIONS
Incomplete Miscarriage  Today's exams show your pregnancy has ended suddenly. This can be emotionally difficult. There is little that can be done to change the way you feel. But understand that miscarriages are common.  About 1 or 2 out of every 10 pregnancies end this way. Some even end before you know you are pregnant. This happens for a number of reasons, and usually the cause is never known. It s important you know that it is not your fault. It didn t happen because you did anything wrong.  Having sex or exercising does not cause a miscarriage. These activities are usually safe unless you have pain or bleeding or your doctor tells you to stop. Even minor falls won t cause a miscarriage. Miscarriages happen because things were not developing as they were supposed to. No medicine can prevent a miscarriage. After you have recovered, you should still be able to get pregnant again. But before trying, talk with your healthcare provider.  It appears that your miscarriage is not yet complete. Some tissue from the pregnancy is in the uterus. You will probably have more cramping and bleeding for the next few days as the tissue leaves your uterus. Usually all of the tissue will pass out by itself. But sometimes tissue remains. In that case, it must be removed to stop bleeding and prevent infection.  Home care  Follow these tips to take of yourself at home:    You can go back to your normal activities if you don t have heavy bleeding or pain.    You may have some cramping and bleeding, but it shouldn t be severe.  Until the bleeding stops completely and to prevent infection:    Don t have sex until your healthcare provider says it s OK.    Use sanitary napkins instead of tampons.    Don t douche.  Having a miscarriage can be very difficult emotionally. It is natural to feel sadness or grief. It may help to talk about your feelings with family and friends, or with a counselor.  Follow-up care  You may pass fetal tissue. If  you see anything, it may appear as a 1-inch or larger piece of gray or pink flesh. If fetal tissue has not passed from your vagina within the next 5 days, you need to see your healthcare provider for another exam. To prevent infection in the uterus, your provider might need to take out the tissue by surgery. Or you may be given medicine to take at home to help your body expel the rest of the tissue.  If you had an ultrasound, a radiologist will review it. You will be told of any new findings that may affect your care.  Call 911  Call 911 if you have:    Severe pain and very heavy bleeding    Severe lightheadedness, passing out, or fainting    Rapid heart rate    Difficulty breathing    Confusion or difficulty waking up  When to seek medical advice  Call your healthcare provider right away if any of these occur:    Heavy bleeding. This means soaking 1 new pad an hour over 3 hours.    Foul-smelling vaginal discharge    Fever of 100.4 F (38 C) or higher, or as directed by your healthcare provider    Pain in your lower belly (abdomen) that gets worse    Weakness or dizziness  Date Last Reviewed: 9/1/2016 2000-2017 [employer]. [address] [place] [address]. All rights reserved. This information is not intended as a substitute for professional medical care. Always follow your healthcare professional's instructions.          Understanding Miscarriage: Emotions  Miscarriage is the unplanned end of a pregnancy that happens before you reach 20 weeks. When a miscarriage happens, you re likely to have a wide range of feelings. Allow yourself to accept how you feel. Only then can you begin to move on.    No one is to blame  Know that you did not cause this to happen. Miscarriage is very common. There is a 15% chance of miscarriage with each pregnancy (after pregnancy has been diagnosed). Miscarriage usually takes place during the first 10 weeks after conception.  Grief takes many forms  Grief may be  the first thing you feel, or it may come upon you later. Perhaps you ll grieve because the future you hoped for is lost. Grief is painful and often lonely. But your miscarriage should become easier to deal with over time.  What you feel is OK  No one can tell you how to respond to your miscarriage. If you have been trying to have a child, this loss may feel overwhelming. Perhaps this was an unplanned pregnancy. That doesn t mean you won t feel loss. You know yourself best. It s OK to feel whatever you feel.  A sense of loss  No matter what you thought about being pregnant, having a miscarriage may cause a sense of loss. You may feel as if something is missing. It s OK if you can t describe how you feel. At first, it may be enough just to look inside yourself and feel your emotions.  Partner s note  Men grieve, too. You may be feeling sad, helpless or frustrated. When you re struggling with your own feelings, knowing how to help your partner may be hard. But do your best to provide support. The following tips may also help:    Be kind to yourself and your partner.    Spend time together.    Fix a meal or bring dinner home for her.    Rent a movie.    If you have children, spend extra time with them.   Date Last Reviewed: 6/1/2016 2000-2017 [employer]. [address] [place] [address], [place] [id number]. All rights reserved. This information is not intended as a substitute for professional medical care. Always follow your healthcare professional's instructions.          Discharge Instructions for Miscarriage  You have had a miscarriage. This is the unplanned end of a pregnancy before the baby is able to live outside the womb. You may have experienced a shock to your system, both physically and emotionally. Because of this, you may not feel well for a few days. Your body is going through changes, and you can expect mood swings. When you are ready, start back to your normal routine.  Home care  Suggestions for  care at home include:    Return to work or your daily routines when you feel ready. This might be right away, or you may want to wait a few days.    Take showers instead of tub baths. This helps prevent infection. Ask your healthcare provider when you can take baths again.    Avoid strenuous exercise, such as aerobics or running, until the bleeding slows to the rate of a normal period.    Don t have sexual intercourse or use tampons or douches until your healthcare provider says it s OK.    Get emotional support. Ask your healthcare provider about support groups in your area. Many women find it helpful to talk to other women who have had a miscarriage.  Follow-up  Make a follow-up appointment with your healthcare provider.  When to call your healthcare provider  Call your healthcare provider right away if you have any of the following:    Fever above 100.4 F (38 C) or chills    Bright red vaginal bleeding or a smelly discharge    Vaginal bleeding that soaks more than one menstrual pad per hour    Belly pain that is severe or getting worse   Date Last Reviewed: 6/1/2016 2000-2017 [employer]. [address] [place] [address]. All rights reserved. This information is not intended as a substitute for professional medical care. Always follow your healthcare professional's instructions.          Understanding Miscarriage: During a Miscarriage  No two miscarriages are alike. Because of this, your healthcare provider will talk with you about the most appropriate treatment for you. If you re in good health and early in the pregnancy, your body may expel all the pregnancy tissue on its own. If your body doesn t expel all the tissue, your healthcare provider may recommend treatment to prevent infection and severe bleeding (hemorrhaging).  What happens during miscarriage  Some miscarriages happen without any signs or symptoms. Most miscarriages, however, start with bleeding and cramping, which may  increase over time. The cramps may get very strong. This is normal when a miscarriage is happening. Cramping widens the passage (cervix) that tissue from the uterus must pass through to leave your body. Your healthcare provider may ask you for a sample of the tissue for lab testing. This is to make sure that the cells being shed from your body are normal.  Diagnosis  To confirm the miscarriage, your healthcare provider will do a pelvic exam. Your healthcare provider might order a blood test to measure the levels of a pregnancy hormone (hCG).  He or she may also have you get an ultrasound test to find out if all of the tissue has passed from the uterus. If a miscarriage happens very early in the pregnancy, an ultrasound is not needed.  Treatment  If any tissue remains in the uterus, your healthcare provider may suggest the following measures depending on your particular situation:    Medicine. This is prescribed for you to take at home. The medicine causes the uterus to expel any remaining tissue. Take the medicine exactly as directed.    Dilation and curettage (D & C). This procedure is done in your healthcare provider s office or at the hospital. You are given medicine to prevent pain or to allow you to relax or sleep during the procedure. The healthcare provider uses instruments to widen the cervix (dilate). Tissue and blood that line the uterus are then removed (curettage).  Be sure you talk to your healthcare provider about the risks and benefits of these treatments.  If you have Rh-negative blood  If your blood is Rh negative, you may need treatment with Rho(D) immune globulin. This injection prevents substances in your blood from attacking the baby s blood during a future pregnancy. Your healthcare provider can tell you more.   Follow-up care  Keep all follow-up appointments. These are needed to make sure that you are healing well. During these visits, mention if you re feeling very sad or depressed. Your  healthcare provider can suggest counseling or other resources to help you.  When to seek medical care  Contact your healthcare provider if you have any of the following:    Severe pain in the stomach, pelvis, or low back    Vaginal discharge that has a bad odor    Bleeding that soaks a new sanitary pad each hour    Fever of 100.4 F (38 C) or higher, or as directed by your healthcare provider   Date Last Reviewed: 6/1/2016 2000-2017 [employer]. [address] [place] [address]. All rights reserved. This information is not intended as a substitute for professional medical care. Always follow your healthcare professional's instructions.      Your appointment with OB/GYN for next week  You will need a follow-up quantitative beta hCG next week.  Pain medications as directed.  There is a marked increase in pain or bleeding, greater than 1 pad per hour, return to the emergency department or call your OB/GYN physician

## 2018-05-04 NOTE — ED AVS SNAPSHOT
Emergency Department    [address] [place] [id number]    Phone:  [phone]    Fax:  [phone]                                       Karin Cardoza   MRN: [id number]    Department:   Emergency Department   Date of Visit:  5/4/2018           After Visit Summary Signature Page     I have received my discharge instructions, and my questions have been answered. I have discussed any challenges I see with this plan with the nurse or doctor.    ..........................................................................................................................................  Patient/Patient Representative Signature      ..........................................................................................................................................  Patient Representative Print Name and Relationship to Patient    ..................................................               ................................................  Date                                            Time    ..........................................................................................................................................  Reviewed by Signature/Title    ...................................................              ..............................................  Date                                                            Time

## 2018-06-25 ENCOUNTER — HOSPITAL ENCOUNTER (EMERGENCY)
Facility: CLINIC | Age: 33
Discharge: HOME | End: 2018-06-25
Attending: PHYSICIAN ASSISTANT | Admitting: PHYSICIAN ASSISTANT
Payer: COMMERCIAL

## 2018-06-25 VITALS
OXYGEN SATURATION: 100 % | BODY MASS INDEX: 23.92 KG/M2 | TEMPERATURE: 97.9 F | DIASTOLIC BLOOD PRESSURE: 97 MMHG | SYSTOLIC BLOOD PRESSURE: 158 MMHG | HEIGHT: 63 IN | WEIGHT: 135 LBS

## 2018-06-25 DIAGNOSIS — R68.84 JAW PAIN: ICD-10-CM

## 2018-06-25 PROCEDURE — 25000125 ZZHC RX 250: Performed by: PHYSICIAN ASSISTANT

## 2018-06-25 PROCEDURE — 99283 EMERGENCY DEPT VISIT LOW MDM: CPT | Mod: 25

## 2018-06-25 PROCEDURE — 64450 NJX AA&/STRD OTHER PN/BRANCH: CPT

## 2018-06-25 PROCEDURE — 25000132 ZZH RX MED GY IP 250 OP 250 PS 637: Performed by: PHYSICIAN ASSISTANT

## 2018-06-25 RX ORDER — BUPIVACAINE HYDROCHLORIDE AND EPINEPHRINE 5; 5 MG/ML; UG/ML
1.8 INJECTION, SOLUTION PERINEURAL ONCE
Status: COMPLETED | OUTPATIENT
Start: 2018-06-25 | End: 2018-06-25

## 2018-06-25 RX ORDER — IBUPROFEN 600 MG/1
600 TABLET, FILM COATED ORAL ONCE
Status: COMPLETED | OUTPATIENT
Start: 2018-06-25 | End: 2018-06-25

## 2018-06-25 RX ORDER — AMOXICILLIN 500 MG/1
500 CAPSULE ORAL 3 TIMES DAILY
Qty: 21 CAPSULE | Refills: 0 | Status: SHIPPED | OUTPATIENT
Start: 2018-06-25 | End: 2018-07-02

## 2018-06-25 RX ADMIN — IBUPROFEN 600 MG: 600 TABLET ORAL at 16:42

## 2018-06-25 RX ADMIN — BUPIVACAINE HYDROCHLORIDE AND EPINEPHRINE BITARTRATE 1.8 ML: 5; .005 INJECTION, SOLUTION SUBCUTANEOUS at 16:46

## 2018-06-25 ASSESSMENT — ENCOUNTER SYMPTOMS
CHILLS: 0
FEVER: 0

## 2018-06-25 NOTE — ED AVS SNAPSHOT
Emergency Department    [address] [facility] [address]    Phone:  [phone]    Fax:  [phone]                                       Karin Cardoza   MRN: [id number]    Department:   Emergency Department   Date of Visit:  6/25/2018           Patient Information     Date Of Birth          1985        Your diagnoses for this visit were:     Jaw pain        You were seen by Viv Gallagher PA-C.      Follow-up Information     Follow up with Mayur Bryson MD In 2 days.    Specialty:  Internal Medicine    Why:  As needed    Contact information:    [facility] [address] [place] [id number] [phone]          Follow up with Dentist. Call in 1 day.        Follow up with  Emergency Department.    Specialty:  EMERGENCY MEDICINE    Why:  As needed, If symptoms worsen    Contact information:    [address] [facility] [id number] [phone]        Discharge Instructions       *Soft or liquid diet.  *Take medications as prescribed. Ibuprofen for pain.  Continue your current medications  *Follow-up with your dentist as soon as possible.  *Return if you develop fever, difficulty opening your mouth or swallowing, Swelling under your chin or over your face, faint or feel like you will faint or become worse in any way.    Discharge Instructions  Dental Pain    You have been seen today for a toothache. Your pain may be caused by an exposed nerve, an infection (pulpitis), a root abscess, or other problems. You will need to see a dentist for a solution to your tooth problem. Emergency Department care is only to help control your problem until you can see a dentist.  Today, we did not find any sign that your toothache was caused by a serious condition, but sometimes symptoms develop over time and cannot be found during an emergency visit, so it is very important that you follow up with your dentist.      Return to the Emergency Department if:    You  develop a fever over 101 degrees Fahrenheit    You can t open your mouth normally, can t move your tongue well, or can t swallow    You have new or increased swelling of your face or neck.    You develop drainage of pus or foul smelling material from around your tooth.  What can I do to help myself?    Take any antibiotic the doctor may have prescribed for you today.    Avoid very hot or very cold foods as both can cause pain.    Make an appointment to see a dentist as soon as possible. If you wish, we can provide you with a list of low-cost dental clinics.       Remember that you can always come back to the Emergency Department if you are not able to see your regular doctor in the amount of time listed above, if you get any new symptoms, or if there is anything that worries you.      24 Hour Appointment Hotline       To make an appointment at any [facility], call [phone] ([phone]). If you don't have a family doctor or clinic, we will help you find one. [place] clinics are conveniently located to serve the needs of you and your family.             Review of your medicines      START taking        Dose / Directions Last dose taken    amoxicillin 500 MG capsule   Commonly known as:  AMOXIL   Dose:  500 mg   Quantity:  21 capsule        Take 1 capsule (500 mg) by mouth 3 times daily for 7 days   Refills:  0          Our records show that you are taking the medicines listed below. If these are incorrect, please call your family doctor or clinic.        Dose / Directions Last dose taken    HYDROcodone-acetaminophen 5-325 MG per tablet   Commonly known as:  NORCO   Dose:  1 tablet   Quantity:  12 tablet        Take 1 tablet by mouth every 6 hours as needed for pain   Refills:  0        meloxicam 15 MG tablet   Commonly known as:  MOBIC   Dose:  15 mg   Quantity:  14 tablet        Take 1 tablet (15 mg) by mouth daily for 14 days   Refills:  0                Prescriptions were sent or printed at these  locations (1 Prescription)                   Other Prescriptions                Printed at Department/Unit printer (1 of 1)         amoxicillin (AMOXIL) 500 MG capsule                Orders Needing Specimen Collection     None      Pending Results     No orders found from 6/23/2018 to 6/26/2018.            Pending Culture Results     No orders found from 6/23/2018 to 6/26/2018.            Pending Results Instructions     If you had any lab results that were not finalized at the time of your Discharge, you can call the ED Lab Result RN at [phone]. You will be contacted by this team for any positive Lab results or changes in treatment. The nurses are available 7 days a week from 10A to 6:30P.  You can leave a message 24 hours per day and they will return your call.        Test Results From Your Hospital Stay               Clinical Quality Measure: Blood Pressure Screening     Your blood pressure was checked while you were in the emergency department today. The last reading we obtained was  BP: (!) 158/97 . Please read the guidelines below about what these numbers mean and what you should do about them.  If your systolic blood pressure (the top number) is less than 120 and your diastolic blood pressure (the bottom number) is less than 80, then your blood pressure is normal. There is nothing more that you need to do about it.  If your systolic blood pressure (the top number) is 120-139 or your diastolic blood pressure (the bottom number) is 80-89, your blood pressure may be higher than it should be. You should have your blood pressure rechecked within a year by a primary care provider.  If your systolic blood pressure (the top number) is 140 or greater or your diastolic blood pressure (the bottom number) is 90 or greater, you may have high blood pressure. High blood pressure is treatable, but if left untreated over time it can put you at risk for heart attack, stroke, or kidney failure. You should have your blood  "pressure rechecked by a primary care provider within the next 4 weeks.  If your provider in the emergency department today gave you specific instructions to follow-up with your doctor or provider even sooner than that, you should follow that instruction and not wait for up to 4 weeks for your follow-up visit.        Thank you for choosing [facility]       Thank you for choosing [facility] for your care. Our goal is always to provide you with excellent care. Hearing back from our patients is one way we can continue to improve our services. Please take a few minutes to complete the written survey that you may receive in the mail after you visit with us. Thank you!        [employer] Information     [employer] lets you send messages to your doctor, view your test results, renew your prescriptions, schedule appointments and more. To sign up, go to [email or web address] . Click on \"Log in\" on the left side of the screen, which will take you to the Welcome page. Then click on \"Sign up Now\" on the right side of the page.     You will be asked to enter the access code listed below, as well as some personal information. Please follow the directions to create your username and password.     Your access code is: [email or web address]  Expires: 2018 11:27 AM     Your access code will  in 90 days. If you need help or a new code, please call your [facility] clinic or [phone].        Care EveryWhere ID     This is your Care EveryWhere ID. This could be used by other organizations to access your [facility] medical records  [id number]        Equal Access to Services     MIGEL BARRIOS : Hadii aad ku hadasho Soomaali, waaxda luqadaha, qaybta kaalmada adeegyada, theresa nieto . So [facility] [phone].    ATENCIÓN: Si habla español, tiene a jules disposición servicios gratuitos de asistencia lingüística. Llame al [phone].    We comply with applicable federal civil rights laws and [place] laws. We do not " discriminate on the basis of race, color, national origin, age, disability, sex, sexual orientation, or gender identity.            After Visit Summary       This is your record. Keep this with you and show to your community pharmacist(s) and doctor(s) at your next visit.

## 2018-06-25 NOTE — ED PROVIDER NOTES
"  History     Chief Complaint:  Dental Pain     HPI   Karin Cardoza is a 33 year old female who presents for evaluation of dental pain. Two days ago, the patient was eating when she broke one of her right lower teeth. Since then, the patient has developed dental pain and swelling at the site of the broken tooth, prompting her to seek evaluation in the ED. She has tried Ibuprofen and Tylenol with limited improvement of her pain. She has not yet been able to schedule an appointment with her dentist. She has not had any fever or chills since breaking the tooth.     Allergies:  Pertussis Vaccine   Sulfa drugs      Medications:    The patient is not currently taking any prescribed medications.      Past Medical History:    Bipolar 1 disorder     Past Surgical History:    History reviewed. No pertinent past surgical history.     Family History:    History reviewed. No pertinent family history.     Social History:  Tobacco use:    Former smoker  Alcohol use:    Positive  Marital status:    Single   Accompanied to ED by:  Alone     Review of Systems   Constitutional: Negative for chills and fever.   HENT: Positive for dental problem (dental pain and swelling, right lower tooth).    All other systems reviewed and are negative.    Physical Exam     Patient Vitals for the past 24 hrs:   BP Temp Temp src Heart Rate SpO2 Height Weight   06/25/18 1552 (!) 158/97 97.9  F (36.6  C) Temporal 76 100 % 1.6 m (5' 3\") 61.2 kg (135 lb)      Physical Exam  General: Alert, interactive. GCS 15  Head:  Scalp is atraumatic.  Eyes:  EOM intact. The pupils are equal, round, and reactive to light. No scleral icterus.  ENT:                                      Ears:  The external ears are normal.  Nose:  The external nose is normal.  Mouth:  Tooth #31 is extracted. Tooth #30 has a fracture to the level of pulp with associated ttp. Associated right cheek swelling. No abscess.   Throat:  The oropharynx is normal. Mucus membranes are moist.       "           Neck:  Normal range of motion. There is no rigidity.   CV:  2+ radial pulse.   MS:  Normal range of motion.   Skin:  Warm and dry.   Neuro:  Strength and sensation grossly intact.   Psych:  Awake. Alert.  Appropriate interactions.     Emergency Department Course     Procedures:    Dental Block     INDICATIONS: Dental pain    ANESTHESIA: Inferior alveolar nerve block using 0.5% Bupivacaine with epinephrine.     PATIENT STATUS: The patient tolerated the procedure well and there were no immediate complications.      Interventions:  1642 Ibuprofen 600 mg PO     Emergency Department Course:  Nursing notes and vitals reviewed.  1630: I performed an exam of the patient as documented above.     1652:  A dental block was performed as outlined in the procedure note above.  The patient tolerated well and there were no complications.     Findings and plan explained to the Patient. Patient discharged home with instructions regarding supportive care, medications, and reasons to return. The importance of close follow-up was reviewed. The patient was prescribed Amoxicillin.      Impression & Plan      Medical Decision Making:  Karin Cardoza is a 33 year old female who presents with dental pain. History and physical exam as detailed above. There is no abscess detected around the tooth amenable to incision and drainage.  The differential diagnosis includes: cracked tooth syndrome, pulpitis, sub-apical abscess, amongst others.  There is no evidence of buccinator/canine space infections, significant facial swelling, or Agus's angina. There are no posterior pharyngeal space infections detected.  Amoxicillin prescribed and emphasized importance of follow up with a dentist/endodontist ASA. Return precautions discussed.      Diagnosis:    ICD-10-CM   1. Jaw pain R68.84       Disposition:  Discharged to home with Amoxicillin.     Discharge Medications:   Details   amoxicillin (AMOXIL) 500 MG capsule Take 1 capsule (500 mg)  by mouth 3 times daily for 7 days, Disp-21 capsule, R-0, Local Print           I, Christian Brown, am serving as a scribe at 4:30 PM on 6/25/2018 to document services personally performed by Viv Gallagher PA-C, based on my observations and the provider's statements to me.     EMERGENCY DEPARTMENT       Viv Gallagher PA-C  06/25/18 1948

## 2018-06-25 NOTE — ED AVS SNAPSHOT
Emergency Department    [address] [place] [address]    Phone:  [phone]    Fax:  [phone]                                       Karin Cardoza   MRN: [id number]    Department:   Emergency Department   Date of Visit:  6/25/2018           After Visit Summary Signature Page     I have received my discharge instructions, and my questions have been answered. I have discussed any challenges I see with this plan with the nurse or doctor.    ..........................................................................................................................................  Patient/Patient Representative Signature      ..........................................................................................................................................  Patient Representative Print Name and Relationship to Patient    ..................................................               ................................................  Date                                            Time    ..........................................................................................................................................  Reviewed by Signature/Title    ...................................................              ..............................................  Date                                                            Time

## 2018-06-25 NOTE — DISCHARGE INSTRUCTIONS
*Soft or liquid diet.  *Take medications as prescribed. Ibuprofen for pain.  Continue your current medications  *Follow-up with your dentist as soon as possible.  *Return if you develop fever, difficulty opening your mouth or swallowing, Swelling under your chin or over your face, faint or feel like you will faint or become worse in any way.    Discharge Instructions  Dental Pain    You have been seen today for a toothache. Your pain may be caused by an exposed nerve, an infection (pulpitis), a root abscess, or other problems. You will need to see a dentist for a solution to your tooth problem. Emergency Department care is only to help control your problem until you can see a dentist.  Today, we did not find any sign that your toothache was caused by a serious condition, but sometimes symptoms develop over time and cannot be found during an emergency visit, so it is very important that you follow up with your dentist.      Return to the Emergency Department if:    You develop a fever over 101 degrees Fahrenheit    You can t open your mouth normally, can t move your tongue well, or can t swallow    You have new or increased swelling of your face or neck.    You develop drainage of pus or foul smelling material from around your tooth.  What can I do to help myself?    Take any antibiotic the doctor may have prescribed for you today.    Avoid very hot or very cold foods as both can cause pain.    Make an appointment to see a dentist as soon as possible. If you wish, we can provide you with a list of low-cost dental clinics.       Remember that you can always come back to the Emergency Department if you are not able to see your regular doctor in the amount of time listed above, if you get any new symptoms, or if there is anything that worries you.

## 2021-04-14 ENCOUNTER — HOSPITAL ENCOUNTER (EMERGENCY)
Facility: CLINIC | Age: 36
Discharge: HOME | End: 2021-04-14
Attending: EMERGENCY MEDICINE | Admitting: EMERGENCY MEDICINE
Payer: COMMERCIAL

## 2021-04-14 VITALS
HEIGHT: 63 IN | WEIGHT: 117 LBS | RESPIRATION RATE: 18 BRPM | TEMPERATURE: 98.4 F | HEART RATE: 100 BPM | BODY MASS INDEX: 20.73 KG/M2 | SYSTOLIC BLOOD PRESSURE: 138 MMHG | DIASTOLIC BLOOD PRESSURE: 102 MMHG | OXYGEN SATURATION: 99 %

## 2021-04-14 DIAGNOSIS — F41.0 ANXIETY ATTACK: Primary | ICD-10-CM

## 2021-04-14 LAB
AMPHETAMINES UR QL SCN: NEGATIVE
BARBITURATES UR QL: NEGATIVE
BENZODIAZ UR QL: NEGATIVE
CANNABINOIDS UR QL SCN: NEGATIVE
COCAINE UR QL: NEGATIVE
HCG UR QL: NEGATIVE
LABORATORY COMMENT REPORT: NORMAL
OPIATES UR QL SCN: NEGATIVE
PCP UR QL SCN: NEGATIVE
SARS-COV-2 RNA RESP QL NAA+PROBE: NEGATIVE
SPECIMEN SOURCE: NORMAL

## 2021-04-14 PROCEDURE — 87635 SARS-COV-2 COVID-19 AMP PRB: CPT | Performed by: EMERGENCY MEDICINE

## 2021-04-14 PROCEDURE — 99285 EMERGENCY DEPT VISIT HI MDM: CPT | Mod: 25

## 2021-04-14 PROCEDURE — 99204 OFFICE O/P NEW MOD 45 MIN: CPT | Performed by: PSYCHIATRY & NEUROLOGY

## 2021-04-14 PROCEDURE — 90791 PSYCH DIAGNOSTIC EVALUATION: CPT

## 2021-04-14 PROCEDURE — 99207 PR CDG-CODE CATEGORY CHANGED: CPT | Performed by: PSYCHIATRY & NEUROLOGY

## 2021-04-14 PROCEDURE — 250N000011 HC RX IP 250 OP 636: Performed by: EMERGENCY MEDICINE

## 2021-04-14 PROCEDURE — 81025 URINE PREGNANCY TEST: CPT | Performed by: EMERGENCY MEDICINE

## 2021-04-14 PROCEDURE — 96372 THER/PROPH/DIAG INJ SC/IM: CPT | Performed by: EMERGENCY MEDICINE

## 2021-04-14 PROCEDURE — 80307 DRUG TEST PRSMV CHEM ANLYZR: CPT | Performed by: EMERGENCY MEDICINE

## 2021-04-14 RX ORDER — HYDROXYZINE HYDROCHLORIDE 50 MG/1
50 TABLET, FILM COATED ORAL EVERY 6 HOURS PRN
Status: DISCONTINUED | OUTPATIENT
Start: 2021-04-14 | End: 2021-04-15 | Stop reason: HOSPADM

## 2021-04-14 RX ORDER — LORAZEPAM 1 MG/1
1 TABLET ORAL 2 TIMES DAILY PRN
Qty: 10 TABLET | Refills: 0 | Status: SHIPPED | OUTPATIENT
Start: 2021-04-14

## 2021-04-14 RX ORDER — LORAZEPAM 2 MG/ML
1 INJECTION INTRAMUSCULAR ONCE
Status: COMPLETED | OUTPATIENT
Start: 2021-04-14 | End: 2021-04-14

## 2021-04-14 RX ADMIN — LORAZEPAM 1 MG: 2 INJECTION INTRAMUSCULAR; INTRAVENOUS at 13:23

## 2021-04-14 ASSESSMENT — MIFFLIN-ST. JEOR
SCORE: 1189.84
SCORE: 1189.84

## 2021-04-14 ASSESSMENT — ENCOUNTER SYMPTOMS
ABDOMINAL PAIN: 0
NERVOUS/ANXIOUS: 1
SHORTNESS OF BREATH: 0
AGITATION: 0
LIGHT-HEADEDNESS: 1
HYPERACTIVE: 0
FEVER: 0

## 2021-04-14 NOTE — PLAN OF CARE
36 year old female received from ER due to increased anxiety and panic attacks. Reports that she recently started Zoloft 50 mg for panic and anxiety a few months ago. Denies SI/HI. Previous MH history includes anxiety. Patient presents with anxious affect, anxious in mood. Patient search completed with two staff members present. Nursing assessment complete including patient and medication profiles. Risk assessments completed addressing suicide and safety issues. Care plan initiated. Video monitoring in progress.

## 2021-04-14 NOTE — ED PROVIDER NOTES
"  History   Chief Complaint:  Anxiety       The history is provided by the patient.      Karin Cardoza is a 36 year old female with history of bipolar, anxiety, depression, among others who presents with increased anxiety when she woke up today. She states that she cannot pin point a specific trigger. She was put on Zoloft a few months ago and has been taking this every day. Here, she notes that she feels lightheaded, unable to relax, and the urge to cry. No other complaints at this time.     Review of Systems   Neurological: Positive for light-headedness.   Psychiatric/Behavioral: Negative for suicidal ideas. The patient is nervous/anxious.    All other systems reviewed and are negative.      Allergies:  Pertussis Vaccine  Sulfa Drugs    Medications:  Zoloft  Atarax    Past Medical History:    Bipolar 1 disorder  Overdose of antidepressant disorder  Anxiety  Alcohol dependence  Paresthesias  Radicular pain of both lower extremities  Yeast infection  Ovarian cysts  Migraines  Acute pancreatitis  PID    Family History:    Father: Arthritis, hypertension, stroke, thyroid disease  Mother: Diabetes, bipolar  Brother: Asthma    Social History:  Patient presents to the ED alone.    Physical Exam     Patient Vitals for the past 24 hrs:   BP Temp Temp src Pulse Resp SpO2 Height Weight   04/14/21 1250 (!) 128/98 98.4  F (36.9  C) Oral 83 18 100 % 1.6 m (5' 3\") 53.1 kg (117 lb)       Physical Exam  Vitals: reviewed by me  General: Pt seen on [facility], pleasant, cooperative, and alert to conversation, very nervous appearing.  Eyes: Tracking well, clear conjunctiva BL  ENT: MMM, midline trachea.   Lungs:  No tachypnea, no accessory muscle use. No respiratory distress.   CV: Rate as above, regular rhythm.    MSK: no peripheral edema or joint effusion.  No evidence of trauma  Skin: No rash, normal turgor and temperature  Neuro: Clear speech and no facial droop.  Psych: Not RIS, no e/o AH/VH.  Very anxious appearing, no " suicidality, no homicidality.      Emergency Department Course     Laboratory:  Asymptomatic COVID-19 PCR: Pending     Emergency Department Course:    Reviewed:  I reviewed nursing notes, vitals and past medical history    Assessments:  1252 I obtained history and examined the patient as noted above.     Interventions:  1323 Ativan 1 mg IM    Disposition:  The patient was transferred to [facility].     Impression & Plan     Covid-19  Karin Cardoza was evaluated during a global COVID-19 pandemic, which necessitated consideration that the patient might be at risk for infection with the SARS-CoV-2 virus that causes COVID-19.   Applicable protocols for evaluation were followed during the patient's care.   COVID-19 was considered as part of the patient's evaluation. The plan for testing is:  a test was obtained during this visit.    Medical Decision Making:  Karin Cardoza is a very pleasant 36 year old female who presents to the Emergency Room with what appears to be very significant anxiety. She has a history of this per her chart and she tells me today that she feels as though she cannot do her normal activities of daily living. She has received a Covid swab, given 1 mg of Ativan, and when Covid swab is negative she can go over to [facility]. She has no suicidality or homicidality and generally seems to want to get help and care with this issue. I anticipate medical clearance and transfer to [facility].     Diagnosis:    ICD-10-CM    1. Anxiety attack  F41.0        Scribe Disclosure:  I, Scott Chang, am serving as a scribe at 12:59 PM on 4/14/2021 to document services personally performed by Andres Curiel MD based on my observations and the provider's statements to me.                Andres Curiel MD  04/14/21 4459

## 2021-04-14 NOTE — ED NOTES
"Ambulated to restroom with writer escort.  Pt states she is \"getting more anxious\"  Agitated and and verbally snapping at  in conversation   "

## 2021-04-15 NOTE — PROGRESS NOTES
Affect calm and cooperative. Patient agreeable to discharge plan. Discharge instructions reviewed with patient including follow-up care plan. Medications: script sent with patient. Reviewed safety plan and outpatient resources. Denies SI and HI. All belongings that were brought into the hospital have been returned to patient. Escorted off the unit at 2200 accompanied by [facility] staff Discharged to home via private vehicle.

## 2021-04-15 NOTE — ED NOTES
Safety Plan:  Warning signs that I notice when a crisis is developing for me:      My breathing gets tightened, I get dizzy, I get hot. I start to worry and ruminate about everything       Things I am able to do on my own to cope:      My art, my painting, walking the dogs, bubble baths. I like to watch a nostalgic movie like Ghostbusters or Buffy       Things I am able to do with other to cope:      Girl time, visits with my best friends, I can talk with my neighbors too.      Things I can do for distraction:      Clean the bathroom, vacuum, clean the windows. I can write in my journals and play with the animals.      Changes I can make to support my mental health:      Start seeing a therapist and psychiatrist regularly. Follow up with my PCP. Start riding my bike and swimming more often.       [facility] mental health crisis team:       [facility] - [phone]      Other things that are important:       Remember to listen to healing music and practice grounding myself.       Practice  mountain climber  or  4 square  breathing         Safety plan provided by:  MARGARITA Mayers

## 2021-04-15 NOTE — DISCHARGE INSTRUCTIONS
Individual Therapy - Tuesday, 5/4/21 at 9 am in person, [facility] - Soraya Bal MA, LP, [address], [address], [facility]     Medication Management - 4/22/21 at 9 am in person, [facility], [facility] - Allyssa Phillips DNP, [address], [place], [place]       Safety Plan:  Warning signs that I notice when a crisis is developing for me:     My breathing gets tightened, I get dizzy, I get hot. I start to worry and ruminate about everything      Things I am able to do on my own to cope:     My art, my painting, walking the dogs, bubble baths. I like to watch a nostalgic movie like [employer] or Buffy      Things I am able to do with other to cope:     Girl time, visits with my best friends, I can talk with my neighbors too.     Things I can do for distraction:     Clean the bathroom, vacuum, clean the windows. I can write in my journals and play with the animals.     Changes I can make to support my mental health:     Start seeing a therapist and psychiatrist regularly. Follow up with my PCP. Start riding my bike and swimming more often.      [facility] mental health crisis team:      [facility] - [phone]     Other things that are important:      Remember to listen to healing music and practice grounding myself.      Practice  mountain climber  or  4 square  breathing
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
## 2021-04-15 NOTE — ED PROVIDER NOTES
I took over care of this patient from my partner, Dr. Curiel, at approximately 1400.    Briefly, patient presented with psychiatric symptoms.  I was asked to assume care, with plan for transfer to the empath unit pending a negative Covid test.  This test did ultimately result negative during her ED course, and she was subsequently transferred uneventfully to the empath unit for more comprehensive psychiatric evaluation.    MD Sophy Monson Jeffrey Alan, MD  04/14/21 9149

## 2021-04-15 NOTE — ED PROVIDER NOTES
"ED Psychiatric EmPATH Note  [facility] Emergency Department - EmPATH Unit    Karin Cardoza MRN: [id number]   Age: 36 year old YOB: 1985     History     Chief Complaint   Patient presents with     Anxiety     36-year-old woman with history of anxiety and panic attacks that has been having a notable increase in panic attacks over the last month. She woke with a severe panic attack just over a month ago. Her PCP prescribed Zoloft. She has continued to have severe panic attacks. She reports to not responding well to hydroxyzine. She was given IM ativan in the ED and fel that worked well. She plans to call her PCP tomorrow to see about increase in Zoloft. She'd like a referral for therapy and psychiatry. Patient denies SI, HI, or AVH.    Of Note, ED note states patient has history of bipolar. Patient denies this, saying that her mother was bipolar but that she only has anxiety and panic.    The history is provided by the patient, the spouse and medical records. No  was used.            Review of Systems   Constitutional: Negative for fever.   Respiratory: Negative for shortness of breath.    Cardiovascular: Negative for chest pain.   Gastrointestinal: Negative for abdominal pain.   Psychiatric/Behavioral: Negative for agitation, behavioral problems and suicidal ideas. The patient is nervous/anxious. The patient is not hyperactive.    All other systems reviewed and are negative.        Physical Examination   BP: (!) 128/98  Pulse: 83  Temp: 98.4  F (36.9  C)  Resp: 18  Height: 160 cm (5' 3\")  Weight: 53.1 kg (117 lb)  SpO2: 100 %    Physical Exam  Vitals signs and nursing note reviewed.   Constitutional:       Appearance: Normal appearance.   HENT:      Head: Normocephalic and atraumatic.   Eyes:      Extraocular Movements: Extraocular movements intact.   Pulmonary:      Effort: Pulmonary effort is normal.   Musculoskeletal: Normal range of motion.   Skin:     General: Skin is dry. "   Neurological:      General: No focal deficit present.      Mental Status: She is alert and oriented to person, place, and time.      Cranial Nerves: No cranial nerve deficit.      Motor: No weakness.      Coordination: Coordination normal.      Gait: Gait normal.           Psychiatric Examination   Appearance: awake, alert, adequately groomed and casually dressed  Attitude:  cooperative  Eye Contact:  good  Mood:  anxious  Affect:  mood congruent  Speech:  clear, coherent and normal prosody  Psychomotor Behavior:  no evidence of tardive dyskinesia, dystonia, or tics and intact station, gait and muscle tone  Throught Process:  logical, linear and goal oriented  Associations:  no loose associations  Thought Content:  no evidence of suicidal ideation or homicidal ideation and no evidence of psychotic thought  Insight:  good  Judgement:  intact  Oriented to:  time, person, and place  Attention Span and Concentration:  intact  Recent and Remote Memory:  intact    ED Course        Labs Ordered and Resulted from Time of ED Arrival Up to the Time of Departure from the ED   SARS-COV-2 (COVID-19) VIRUS RT-PCR   HCG QUALITATIVE URINE   DRUG ABUSE SCREEN 77 URINE (FL, RH, SH)       Assessments & Plan (with Medical Decision Making)   Patient presenting with recurrent severe panic attacks. Nursing notes reviewed.     The patient currently does not display any acute safety risks. She is agreeable to referral to therapy and psychiatric prescriber. Patient found IM ativan helpful. Will supply her with 10 tabs of 1 mg Ativan to get her to her psychiatry appointment on the 22nd.    I have reviewed the DEC assessment complete by Wendy Davey dated 4/14/2021.    Preliminary diagnosis:  Panic disorder    --  Clifton Vázquez MD   [facility] EMERGENCY DEPT  EmPATH Unit  4/14/2021        Clifton Vázquez MD  04/14/21 2125

## 2021-05-25 ENCOUNTER — RECORDS - HEALTHEAST (OUTPATIENT)
Dept: ADMINISTRATIVE | Facility: CLINIC | Age: 36
End: 2021-05-25

## 2021-05-31 ENCOUNTER — RECORDS - HEALTHEAST (OUTPATIENT)
Dept: ADMINISTRATIVE | Facility: CLINIC | Age: 36
End: 2021-05-31

## 2021-06-11 NOTE — PROGRESS NOTES
Weekly Progress Note  Karin Cardoza  1985  [id number]      D) Pt attended 3 groups  this week with no absences, also attended 1:1 with counselor. A) Staff facilitated groups and reviewed tx progress. Assessed for [facility]. R) No VAP needed at this time. Pt working on the following dimensions:  Dimension #1 - Withdrawal Potential - Risk 0, no concerns. Patient reports continued abstinence since prior to outpatient treatment (Method 1).  Dimension #2 - Biomedical - Risk 1, mild concerns. Patient reported that she has had some bulimic urges, but has not binged or purged. Counselor and patient discussed continuing to monitor this (Method 1)  Dimension #3 - Emotional/Behavioral/Cognitive - Risk 2, moderate concerns. Patient reports re-engaging with psychiatrist and therapist this past week, and that she is feeling positive about this (Method 1). Patient reported difficulty this past week learning of her father's cancer diagnosis, and also reports feeling deeply affected by other's emotions. Patient accepted cognitive distortions worksheets, and plans to be more aware of her thinking (Method 2).   Dimension #4 - Treatment Acceptance/Resistance - Risk 0, no concerns. Patient attended all groups this week and actively participated (Method 1).   Dimension #5 - Relapse Potential - Risk 3, serious concerns. Patient reports that she constantly has urges and addictive thinking, has primarily coped by keeping busy (Method 1)  Dimension #6 - Recovery Environment - Risk 2, moderate concerns. Patient reports working part-time and that she has support from her work and father. Patient also reports she has been attending AA meeting weekly, and reports interest in ED/AA group, but has been unable to attend so far (Method 2). Patient identified needing to create boundaries with destructive people in her life.   T) Client educated on Building Recovery Support. Client has completed 20 of 90 hours of program at this time. Projected  discharge date is 9/8/17. Current discharge plan is referral to Relapse Prevention.       JODEE Velasco, [facility]  6/15/2017, 5:51 PM       Weekly Educational Topics Date Education   1. Understanding Dual Diagnoses, week 1         Understanding Dual Diagnoses, week 2     2. Coping with Stress     3. Managing Difficult Feelings 6/5, 6/6, 6/7, 6/9    4. Building Recovery Support 6/12, 6/13, 6/14    5. Managing Thinking Problems     6. Developing Assertive Communication Skills     7. The Art of Change     8. Relapse Prevention     9. Relationships/Boundaries     10. Grief and Loss     11. Strengths     12. Dual Recovery Planning     Seeking Safety Unit every Tuesday 6/6, 6/13    Mindfulness every Friday 6/9    OT/RT every Wednesday for one hour

## 2021-06-11 NOTE — PROGRESS NOTES
Karin KYLEIGH Cardoza attended 2 hours of group therapy today. Patient reported suicidal overdose on 7/7/17 and was hospitalized over the weekend. Patient and counselor processed this even, patient denied current suicidal intent or plan, but reported feeling very little hope. Counselor and patient developed a suicide prevention plan (see documentation), committed willingness to utilizing of this plan and patient planned to reach out to her other providers today. Counselor to follow up with patient on 7/12/17.     7/10/2017 1:48 PM Sunita Rincon

## 2021-06-11 NOTE — PROGRESS NOTES
"Weekly Progress Note  Arunmore Karin  1985  [id number]      D) Pt attended ZERO groups  this week with 4 absences. Patient did contact staff and reported plan to return to group \"soon.\" A) Staff facilitated groups and reviewed tx progress. Assessed for VA. R) Unable to assess along six dimensions or for VA due to lack of attendance.   T) Client has completed 20 of 90 hours of program at this time. Projected discharge date is 9/8/17. Current discharge plan is referral to Relapse Prevention.       Sunita Rincon, MPS, [facility]  6/23/2017, 3:35 PM       Weekly Educational Topics Date Education   1. Understanding Dual Diagnoses, week 1         Understanding Dual Diagnoses, week 2     2. Coping with Stress     3. Managing Difficult Feelings 6/5, 6/6, 6/7, 6/9    4. Building Recovery Support 6/12, 6/13, 6/14    5. Managing Thinking Problems     6. Developing Assertive Communication Skills     7. The Art of Change     8. Relapse Prevention     9. Relationships/Boundaries     10. Grief and Loss     11. Strengths     12. Dual Recovery Planning     Seeking Safety Unit every Tuesday 6/6, 6/13    Mindfulness every Friday 6/9    OT/RT every Wednesday for one hour             "

## 2021-06-11 NOTE — PROGRESS NOTES
[facility]   Mental Health and Addiction Care   [facility], [facility], and [facility] [facility] [employer]    [phone] or [phone]   Treatment Plan    Patient  Name: Karin Cardoza  MRN: [id number]   Counselor: JODEE Velasco, [facility]      Title: Dimension 1 Withdrawal Potential, Risk level 0, no concerns  Plan Date: 6/8/2017  Problem: Patient reports date of last use of alcohol on 4/25/17, last use of marijuana on 5/26/17. Patient does not report or display any withdrawal concerns at this time.     Goal: Begin Date: 6/8/2017    Target Date: 9/8/17  Maintain abstinence throughout treatment in order to avoid experiencing withdrawal symptoms and further your recovery    Method 1: Begin Date:6/8/2017   Target Date: 9/8/17  Date Completed:   Patient to report any substance use to counselor to determine what changes need to be made for the you to be successful in recovery.         Title: Dimension 2 Biomedical condition, Risk level 1, mild concerns  Plan Date: 6/8/2017  Problem: Patient reports no current biomedical concerns, but also reports history of bulimia.     Goal: Begin Date: 6/8/2017    Target Date: 9/8/17  To maintain stable physical health to participate in treatment services, while also making healthy changes to support ongoing recovery.     Method 1: Begin Date: 6/8/2017   Target Date: 9/8/17  Date Completed:   Patient to report any changes in physical health that would impact treatment participation.     Is Nicotine use indicated on the assessment? yes  Method 2: Begin Date: 6/8/2017   Target Date: 9/8/17  Date Completed:   Staff to provide Pt with nicotine cessation information and help on how to quit use. Pt to report progress on stopping nicotine use to staff.     Method 3: Begin Date: 6/8/2017   Target Date: 9/8/17  Date Completed:   Patient to report to primary counselor urges and symptoms related to bulimia, to determine if you have any additional treatment needs in this area.         Title:  Dimension 3, Emotional, behavioral, cognitive condition, Risk level 2, moderate concerns  Plan Date: 6/8/2017  Problem: Patient reports history of mental health diagnoses of BPD, bipolar, anxiety and depression and reports that she is re-engaging with her mental health providers. Patient reports difficulty managing emotions and low self worth.     Goal: Begin Date: 6/8/2017    Target Date: 9/8/17   To better understand the relationship between mental health and chemical use.     Method 1: Begin Date:6/8/2017   Target Date: 9/8/17   Date Completed:   Patient to attend all mental health appointments, share in group how you feel that this is helping or not helping you.      Method 2: Begin Date: 6/8/2017   Target Date: 9/8/17   Date Completed:   Patient to participate in daily check-in, discussing your thoughts and feelings to develop better understanding of your mental health symptoms.    Method 3: Begin Date: 6/8/2017   Target Date: 9/8/17   Date Completed:   Patient to identify 5 negative things that you say about yourself, and identify ways that you can challenge these thoughts. Share with counselor.         Title: Dimension 4, Treatment Acceptance/Resistance, Risk level 0, no concerns  Plan Date: 6/8/2017  Problem: Patient reports no external motivation, appears to have a strong desire for recovery    Goal: Begin Date: 6/8/2017    Target Date: 9/8/17  Patient to participate in MICD treatment as a way to enhance motivation towards recovery.     Method 1: Begin Date: 6/8/2017   Target Date: 9/8/17   Date Completed:   Patient to participate in all scheduled groups and inform staff if unable to attend ([facility]: [phone])        Title: Dimension 5, Relapse potential, Risk level 3, serious concerns  Plan Date: 6/8/2017    Problem: Patient reports problematic alcohol use over the past several years, and identifies it has been the primary way that she has dealt with negative emotions. Patient reports no prior chemical  health treatments.     Goal: Begin Date: 6/8/2017    Target Date: 9/8/17  Patient to report better understanding of urges and triggers, and coping skills to maintain abstinence throughout outpatient treatment.     Method 1: Begin Date: 6/8/2017   Target Date: 9/8/17   Date Completed:   Patient to participate in daily check-in and share any urges and addictive things in order to gain more insight into your pattern of use.     Method 2: Begin Date: 6/8/2017   Target Date: 9/8/17   Date Completed:   Patient to identify your ten biggest triggers to alcohol use, and identify alternatives that your can take to avoid use. Share with counselor.         Title: Dimension 6, Recovery Environment, Risk level 2, moderate concerns  Plan Date:  6/8/2017  Problem: Patient reports that she is currently living with her ex-significant other and working part-time. Patient reports that she is re-engaging with her hobbies, and has begun attending AA. Patient reports some support from family and friends, and describes her ex-significant other as not supportive. Patient reports no current legal concerns.     Goal: Begin Date: 6/8/2017    Target Date: 9/8/17  Patient to make changes in her environment that will support ongoing recovery.     Method 1: Begin Date: 6/8/2017   Target Date: 9/8/17   Date Completed:   Patient to identify 5 goals that she has for the next six months, and identify what steps you need to take towards these goals. Share in group/with counselor any successes or challenges.     Method 2: Begin Date: 6/8/2017   Target Date: 9/8/17    Date Completed:   Patient to continue at least weekly AA/recovery group participation, share any benefits that you are experience through participation in the recovery community.           By signing this document, I am acknowledging that I was actively and directly involved in the development of my treatment plan.     Patient  Signature_________________________________________          Date__________________        Staff Signature  JODEE Velasco  [facility]     Date: 6/9/17, 9:33 AM

## 2021-06-11 NOTE — PROGRESS NOTES
NICOLE. Counselor met with with patient 1:1 to discuss treatment planning. A. Counselor inquired into patient's history with chemical health and mental health, including previous services. Counselor discussed with patient where she would like to be in her recovery after treatment. Counselor also inquired as to patient's history with bulimia and SI/SIB. Counselor encouraged patient to continue to make changes to support her recovery. R. Patient discussed growing up with a mother who struggled with addiction and a father who wasn't around and had difficulty tolerating emotion. Patient reported that this was her first treatment for chemical health but had had previous mental health services including DBT. Patient discussed difficulty in her relationships, which has been partly related to her alcohol use. Patient reported that she is working on being aware of her emotions and thinking and making choices about her actions. Patient discussed struggling with self-worth and that she hopes at the end of treatment to be able to handle problems better. Patient reported some bulimic urges but no behavior, and reported no SI in the last month, last SIB more than 1 year ago. SEEMA. Counselor to present treatment plan to patient.     JODEE Velasco, [facility]  6/8/2017, 3:11 PM

## 2021-06-11 NOTE — PROGRESS NOTES
Weekly Progress Note  Karin Cardoza  1985  [id number]      D) Pt attended 4 groups  this week with no absences. . A) Staff facilitated groups and reviewed tx progress. Assessed for VA. R) No VAP needed at this time. Pt working on the following dimensions:  Dimension #1 - Withdrawal Potential - Risk 0, no concerns. Patient reports continued abstinence since prior to outpatient treatment (Method 1).  Dimension #2 - Biomedical - Risk 1, mild concerns. Patient did not indicate any bulimic urges or any other changes to physical health. Counselor and patient discussed continuing to monitor this (Method 1)  Dimension #3 - Emotional/Behavioral/Cognitive - Risk 2, moderate concerns. Patient reports continuing to work with therapist and psychiatrist and reports feeling that her medications are working (Method 1). Patient reported difficulty this past week learning of her father's cancer diagnosis as well as relating to her ex-significant other. Patient was receptive to counselor/group feedback on setting limits with this relationship.    Dimension #4 - Treatment Acceptance/Resistance - Risk 0, no concerns. Patient attended all groups this week and actively participated (Method 1).   Dimension #5 - Relapse Potential - Risk 3, serious concerns. Patient reports that she constantly has urges and addictive thinking, has primarily coped by keeping busy (Method 1)  Dimension #6 - Recovery Environment - Risk 2, moderate concerns. Patient reports working part-time and that she has support from her work and father. Patient did not indicate if she has been attend AA this past week. Patient reports that she has moved in with her father to help take care of him, which has caused additional conflict with her ex-significant other.    T) Client educated on Communication Skills. Client has completed 28 of 90 hours of program at this time. Projected discharge date is 9/8/17. Current discharge plan is referral to Relapse Prevention.        Sunita Rincon, [facility], [facility]  6/30/2017, 3:40 PM       Weekly Educational Topics Date Education   1. Understanding Dual Diagnoses, week 1         Understanding Dual Diagnoses, week 2     2. Coping with Stress     3. Managing Difficult Feelings 6/5, 6/6, 6/7, 6/9    4. Building Recovery Support 6/12, 6/13, 6/14    5. Managing Thinking Problems     6. Developing Assertive Communication Skills 6/26, 6/27, 6/28, 6/30    7. The Art of Change     8. Relapse Prevention     9. Relationships/Boundaries     10. Grief and Loss     11. Strengths     12. Dual Recovery Planning     Seeking Safety Unit every Tuesday 6/6, 6/13, 6/27    Mindfulness every Friday 6/9, 6/30    OT/RT every Wednesday for one hour

## 2021-06-11 NOTE — PROGRESS NOTES
[facility]  SUICIDE SAFETY PREVENTION  PLAN  Patient's Name: Karin Cardoza  MRN:[id number]    Date of Service: 7/10/2017  Provider: JODEE Velasco, JOEY        Step 1: Warning signs:  1. Overwhelming emotions   2. Feeling unable to do anything--will just lay there.   3.         Wanting to drink     Step 2: Internal coping strategies -   Things I can do to take my mind off my problems without contacting another person:  1.  Working on art  2. Going for walks or bikeride   3. Journaling   4.         Cooking    Step 3: People and social settings that provide distraction:    1. Name: Darius                                                          2. Name: Ya                                                              3. Place: Find a new spot a park/river                                      4.         Place: [place]    Step 4: People whom I can ask for help:   1. Name: Maximo                                                   Phone:   2. Name:                                                              Phone:   3. Name:                                                              Phone:     Step 5:Professionals or agencies I can contact during a crisis:     1. Clinician Name:                                                Phone:    Clinician Pager or Emergency Contact #:     2. Clinician Name:                                                Phone:    Clinician Pager or Emergency Contact #:     3. [facility]:    Urgent Care Services  Address: [address] [place] [address]. [place] [address]   Urgent Care Services  Phone:  [phone]      4.. Suicide Prevention Crisis Connection: [phone]    Step 6: Making the environment safe:  1.  Have dad hold onto medications  2. Removing knives from living area.     Safety Plan Treatment Manual to Reduce Suicide Risk:  ([employer], 2008).

## 2021-06-11 NOTE — PROGRESS NOTES
Weekly Progress Note  Polina Cardozath  1985  [id number]      D) Pt attended 3 groups  this week with 1 absence, also met individually with counselor . A) Staff facilitated groups and reviewed tx progress. Assessed for VA. R) No VAP needed at this time. Pt working on the following dimensions:  Dimension #1 - Withdrawal Potential - Risk 0, no concerns. Patient reported drinking at the time of OD, but has returned to abstinence (Method 1).  Dimension #2 - Biomedical - Risk 1, mild concerns. Patient reported a bulimic episode on 7/1/17, and counselor and patient processed this event, and discussed what the patient can learn from this. Patient reported no bulimic episodes this week, no other changes to her heatlh (Method 1)  Dimension #3 - Emotional/Behavioral/Cognitive - Risk 2, moderate concerns. Patient reports continuing to work with therapist and psychiatrist and reports feeling that her medications are working (Method 1). Patient reported that her therapist will be referring her to a DBT program, and counselor and patient discussed potential benefits to this (Method 1). Patient reported intentional overdose on 7/7/17, at which time she was hospitalized at [facility] (see documentation). Patient reports she has not reached out to her therapist yet, which patient's counselor strongly encouraged her to do so. Patient signed release for therapist on 7/14/17. Patient reported on 7/14/17 that she is feeling more hopeful and is reaching out to people.   Dimension #4 - Treatment Acceptance/Resistance - Risk 0, no concerns. Patient actively participated when present, but did not communicate with staff regarding absence on 7/12/17 (Method 1). Patient reported continuing to struggle with depression, but is committed to coming to group.   Dimension #5 - Relapse Potential - Risk 3, serious concerns. Patient reports that she constantly has urges and addictive thinking, but has had continued abstinence since 7/7/17.  Patient reports that she is trying to keep busy and reach out ot people.   Dimension #6 - Recovery Environment - Risk 2, moderate concerns. Patient reports working part-time and that she has support from her work and father. Patient reported plan to attend AA group at [facility]. Patient discussed in group that she feels that she is starting to get over her previous relationship.   T) Client educated on Relapse Prevention. Client has completed 37 of 90 hours of program at this time. Projected discharge date is 9/8/17. Current discharge plan is referral to Relapse Prevention.       Sunita Rincon, JODEE, [facility]  7/14/2017, 3:52 PM       Weekly Educational Topics Date Education   1. Understanding Dual Diagnoses, week 1         Understanding Dual Diagnoses, week 2     2. Coping with Stress     3. Managing Difficult Feelings 6/5, 6/6, 6/7, 6/9    4. Building Recovery Support 6/12, 6/13, 6/14    5. Managing Thinking Problems     6. Developing Assertive Communication Skills 6/26, 6/27, 6/28, 6/30    7. The Art of Change 7/5    8. Relapse Prevention 7/10, 7/11, 7/14    9. Relationships/Boundaries     10. Grief and Loss     11. Strengths     12. Dual Recovery Planning     Seeking Safety Unit every Tuesday 6/6, 6/13, 6/27, 7/11    Mindfulness every Friday 6/9, 6/30, 7/14    OT/RT every Wednesday for one hour

## 2021-06-11 NOTE — PROGRESS NOTES
Addiction Services - Initial Services Plan      Name:  Karin Cardoza       :  1985        MRN:  [id number]    Goal Methods   1.  Acceptance of chemical dependency and mental illness as a disease. A.  Comply with all med/psych recommendations  B.  Complete preliminary interviews  C.  Attend all program functions  D.  Attend all individual counseling sessions  E.  Read all assigned literature  F.  Complete psychological testing as assigned  G.  Particpate in any necessary consultations     2.  Acceptance of my need and ability to change A.  Complete written workbook assignments on MICD  B.  Participate in conferences (P.O., , family)  C.  Participate in 12 Step/support groups if desired  D.  Actively participate in treatment planning and reviews  E.  Complete all assignments given or recommended on Treatment Plan  F.  Present Drug History/Peer Assessment with peer group  G.  Complete 10th Step Inventory daily   3.  Acceptance of staff recommendations as a means to my recovery A.  Participate in all interviews for Continuum of Care Plans  B.  Familiarize self with 12 Step Recovery Program and how this applies to your day-to-day behavior  C.  Demonstrate a working knowledge of AA steps of recovery  D.  Obtain a sponsor if desired     Patient describes their immediate need:  None  Are there any immediate Safety Needs such as (physical, stability, mobility):  None    Immediate Health Needs and Plan:    None  Vulnerable Adult:  No    [x] Continue Current Medications for: depression, anxiety, sleeping  [] Request Consult for:  [] Notify Attending Physician about:  [] Other:      Issues to be addressed in the first sessions:    Become acquainted with group norms and other group members.   Set up time to meet 1:1 with primary counselor.     Patient strengths and needs:  Strengths: Humility, sociable, sense of humor.     Needs: self-confidence, socializing skills, self-loathing thoughts/negative  ruminating, stubbornness.     Plan for patient for time between intake and completion of the treatment plan:  Remain abstinent from any illicit substance/alcohol use, and start group on 6/5/17.   Participate in all treatment activities and assignments.     Staff Members' Titles authorized to Initiate Services are:    Director of Behavioral Service    Clinical Director of Chemical Dependency    Primary Counselor    MI/ARSLAN Cuevas. Counselor        Nursing Staff    Vulnerable Adult Review  [x] Review of the facility Abuse Prevention plan was reviewed with the patient  [] No individual abuse plan is necessary  [x] In addition to the facility Abuse Prevention plan, an Individual Abuse Plan will be put in place    I understand these goals to be the Treatment Goals of the Program, and I agree to the stated Methods in attempting to accomplish these goals.    Patient Signature:  _________________________Date:  6/1/2017      Staff Name/Title:  JOEY Alston    Date:  6/1/2017  Time: 3:01 PM

## 2021-06-11 NOTE — PROGRESS NOTES
D) Karin is a 32 y.o. y.o. White or  female who is referred to MICD OP via [facility] with funding from [facility].  Patient orientated x3.  Currently meets criteria for Alcohol Use Disorder, Severe (F10.20), Cannabis Use Disorder, Moderate (F12.20).  Patient appears appropriate for MICD OP at this time.    A) Completed updated intake assessment; preliminary paperwork; presented MERVIN, Eilseo, jan procedure, Vulnerable Adult (VA) policy, TB & HIV/AIDS info and resources, confidentiality & HIPAA policies, Facility Abuse Prevention Plan, group rules/expectations, Patient Bill of Rights, counselor & supervisor license number and contact info, and PANSI.  Patient given statement of patient rights & responsibilities.  Conducted VA Assessment.    R) No special VA plan needed at this time.  PANSI score:  High Risk.  Patient denied suicidal ideation/intent/plan/means at this time.  Patient signed and agreed to ROIs, VA Policy, confidentiality & HIPAA polices, group rules/expectations, and PANSI.    Dimension #1 - Withdrawal Potential - Risk 0, no concern.  Client reports a history of alcohol use 4-5 days per week. Client reports that he last use of alcohol was 4/25/17. Client reports use of NyQuil frequently, when she could not afford alcohol, and states that her last use of that was in April 2017. Client also reports frequent use of marijuana, with her last use being 5/26/17. Client denies any current withdrawal symptoms, but does endorse experiencing some a few weeks after her last drink. Client shows no signs or symptoms of intoxication or withdrawal at this time.    Dimension #2 - Biomedical Conditions - Risk 1, mild concern. Client denies any current health concerns. Client does report that she struggle with Bulimia, and is hoping to get help for that following this treatment. Client reports that she does have a PCP whom she sees for her medical concerns. Client  appears to be in good physical health and functioning at this time, and appears able to get her medical needs met as necessary.    Dimension #3 - Emotional/Behavioral/Cognitive - Risk 2, moderate concern.  Client reports mental health diagnoses of borderline personality disorder, anxiety, depression, and bipolar disorder. Client reports that she does take medications for her mental health, but that she was recently started on new medication and does not believe that it is fully effective yet. Client does not currently have mental health services, but reports that she has appointments scheduled starting in July. Client reports recurrent suicidal thoughts, and suicidal threats a little over 1 month ago. Client denies any current suicidal or homicidal thoughts or plans. Client contracts for safety. Client is oriented x4.   Dimension #4 - Treatment Acceptance/Resistance - Risk 0, no concern.  Client verbalizes that she believes that she has a problem, and that she has a desire to get help and to change at this time. Client appears genuine in her motivation, and appears motivated for treatment at this time. Client was calm and cooperative throughout this intake..  Dimension #5 - Relapse Potential - Risk 3, Serious concern.   Client reports no history of treatment, and so may lack knowledge of addiction and recovery. Client reports that her longest period of sobriety was about 3 months, and so appears to lack coping skills to arrest her use and prevent relapse. Client may have some skills as she reports no alcohol use in about 1 month. Client may be at an increased risk of relapse due to her mental health that is just recently being addressed, as well as the amount of time and frequency of her use. .     Dimension #6 - Recovery Environment - Risk 3, Serious concern.   Client reports that she currently works only 1 day per week, and so may lack structured activity. Client was able to identify a number of hobbies that she  enjoys while sober. Client reports that her daily life consisted of drinking prior to this past month. Client reports that her friends and family are supportive of her recovery. She states that some of her friends use, but that she has been working to separate herself from them. Client does identify stress and her mental health as threats to her recovery at this time. Client denies any current legal involvement. 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 Client reports that she has been attending support groups about 3x per week recently. .      T) Explained Eliseo JEFFRIES, jan procedure, VA policy, TB & HIV/AIDS info and resources, confidentiality & HIPAA policies, Facility Abuse Prevention Plan, group rules/expectations, Patient Bill of Rights, counselor & supervisor license number and contact info, PANSI.    Patient expected to start group on 6/5/17.      Christin Mercedes [facility]  6/2/2017, 9:31 AM
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
## 2021-06-11 NOTE — PROGRESS NOTES
NICOLE. Counselor and patient met 1:1 per patient's request. A. Counselor as to patient's desire to meet. Counselor encouraged patient to noticed when she is being overly affected emotionally by other group members and then to work on distress tolerance and grounding. Counselor discussed with patient how the patient can make sure that she is taking care of her emotional health while taking care of her father, and also how she wants to spend the time that she has left with her father. Counselor also encouraged patient to notice her thinking and symptoms and then choose how she wants to respond instead of react. Counselor provided patient with a list of cognitive distortions. R. Patient reported finding out this past week that her father has inoperable cancer, and identifies that she wants her father to be proud of her, which means being sober. Patient reported that finding out her father's diagnosis has been difficult but that she has not given into urges. Patient reported feeling bothered by another group members negative emotion, but was receptive to feedback. Patient also discussed that she has sought out relationships to not feel alone, but is also realizing that she needs to work on herself. Patient reports that she wants to be more aware of her thinking and will read cognitive distortions worksheets. T. Counselor to follow with patient in group.     JODEE Velasco, [facility] [place]  6/15/2017, 4:19 PM
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
## 2021-06-11 NOTE — PROGRESS NOTES
Weekly Progress Note  Karin Cardoza  1985  [id number]      D) Pt attended 4 groups  this week with no absences, also attended 1:1 with counselor. A) Staff facilitated groups and reviewed tx progress. Assessed for [facility]. R) No VAP needed at this time. Pt working on the following dimensions:  Dimension #1 - Withdrawal Potential - Risk 0, no concerns. Patient reports continued abstinence since prior to outpatient treatment (Method 1).  Dimension #2 - Biomedical - Risk 1, mild concenrs. Patient reported that she has had some bulimic urges, but has not binged or purged. Counselor and patient discussed continuing to monitor this (Method 1)  Dimension #3 - Emotional/Behavioral/Cognitive - Risk 2, moderate concerns. Patient reports that she is currently meeting with counselor through [facility], but plans to return to her psychologist and psychiatrist next week (Method 1).  Dimension #4 - Treatment Acceptance/Resistance - Risk 0, no concerns. Patient attended all groups this week and actively participated (Method 1).   Dimension #5 - Relapse Potential - Risk 3, serious concerns. Patient reports that she constantly has urges and addictive thinking, has primarily coped by keeping busy (Method 1)  Dimension #6 - Recovery Environment - Risk 2, moderate concerns. Patient reports working part-time and that she has support from her work and father. Patient also reports she has been attending AA meeting weekly (Method 2)  T) Client educated on Coping with Difficult Feelings. Client has completed 13 of 90 hours of program at this time. Projected discharge date is 9/8/17. Current discharge plan is referral to Relapse Prevention.       Sunita Rincon, JODEE, [facility]  6/9/2017, 4:00 PM       Weekly Educational Topics Date Education   1. Understanding Dual Diagnoses, week 1         Understanding Dual Diagnoses, week 2     2. Coping with Stress     3. Managing Difficult Feelings 6/5, 6/6, 6/7, 6/9    4. Building  Recovery Support     5. Managing Thinking Problems     6. Developing Assertive Communication Skills     7. The Art of Change     8. Relapse Prevention     9. Relationships/Boundaries     10. Grief and Loss     11. Strengths     12. Dual Recovery Planning     Seeking Safety Unit every Tuesday 6/6    Mindfulness every Friday 6/9    OT/RT every Wednesday for one hour
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
## 2021-06-12 NOTE — PROGRESS NOTES
Karin Cardoza attended 3 hours of group therapy today.    Rema Barnard MA, LMFT, [facility] [place]  8/22/2017, 4:40 PM
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
## 2021-06-12 NOTE — PROGRESS NOTES
"D. Counselor and patient met to discuss treatment progress. A. Counselor inquired as to patient's progress on treatment assignments, as well as referral to DBT and working with other providers. Counselor encouraged patient to work on assignments, and determined that adding developing \"game plans\" for when she is situations where she may be more triggered to drink or binge on food, so that that she feels that she can address these problems. Counselor also encouraged patient to contact staff when unable to attend. R. Patient reported that she hadn't worked on many treatment assignment, but feels that they would be beneficial for her. Patient reported that she sees her therapist every 2 weeks, and hasn't been restarted on her medications yet. Patient reported that she has noticed having more problems binge in food lately, but hasn't been purging. Patient reported understanding need to call in if she is unable to attend. T. Counselor to update patient's treatment plan and present to patient.     JODEE Velasco, [facility]  8/1/2017, 8:25 AM    "

## 2021-06-12 NOTE — PROGRESS NOTES
Weekly Progress Note  Karin Cardoza  1985  [id number]      D) Pt attended 3 groups  this week with 1 expected absence, also met individually with counselor. A) Staff facilitated groups and reviewed tx progress. Assessed for [facility]. R) No VAP needed at this time. Pt working on the following dimensions:  Dimension #1 - Withdrawal Potential - Risk 0, no concerns. Patient reported drinking at the time of OD, but has returned to abstinence (Method 1).  Dimension #2 - Biomedical - Risk 1, mild concerns. Patient reported a bulimic episode on 7/1/17, and counselor and patient processed this event, and discussed what the patient can learn from this. Patient reported no bulimic episodes this week, no other changes to her heatlh (Method 1).   Dimension #3 - Emotional/Behavioral/Cognitive - Risk 2, moderate concerns. Patient reports continuing to work with therapist and psychiatrist. Patient reported that her therapist will be referring her to a DBT program. Patient reported intentional overdose on 7/7/17, at which time she was hospitalized at [facility] (see documentation). Patient reports she has not reached out to her therapist yet, which patient's counselor strongly encouraged her to do so. Patient reports that she has not been on her medication since the overdose, but will get back on them within a week, and that her father will be holding on her medication, per her safety plan (Method 1). When meeting individually, counselor and patient discussed patient working on Method 3, and patient also identified wanting to work her her social skills (Method 4). Patient also discussed this week that she struggles significantly with self-loathing and is working on talking to herself more compassionately (Method 2).   Dimension #4 - Treatment Acceptance/Resistance - Risk 0, no concerns. Patient and counselor met to discuss treatment progress, and counselor reminded patient to contact staff if she is unable to attend.  "Patient attended all scheduled group times and participated actively (Method 1).   Dimension #5 - Relapse Potential - Risk 3, serious concerns. Patient reports that she constantly has urges and addictive thinking, but has had continued abstinence since 7/7/17. Patient reports that she is trying to keep busy and reach out ot people. Patient reported that she has had some difficulty not bingeing with food, and patient's treatment plan was modified to create \"game plans\" for herself (Method 2).    Dimension #6 - Recovery Environment - Risk 2, moderate concerns. Patient reports working part-time and discussed individually that she is struggling with the culture at her work. Patient continues to report plan to attend AA group at [facility], but has not done so. Patient reported that she is struggling some with caring for her father, was encouraged by group to set boundaries.  Patient reported plan to work on her goals (Method 1).   T) Client educated on Strengths. Client has completed 56 of 90 hours of program at this time. Projected discharge date is 9/8/17. Current discharge plan is referral to Relapse Prevention.       JODEE Velasco, [facility]  8/6/2017, 3:01 PM       Weekly Educational Topics Date Education   1. Understanding Dual Diagnoses, week 1         Understanding Dual Diagnoses, week 2     2. Coping with Stress     3. Managing Difficult Feelings 6/5, 6/6, 6/7, 6/9    4. Building Recovery Support 6/12, 6/13, 6/14    5. Managing Thinking Problems     6. Developing Assertive Communication Skills 6/26, 6/27, 6/28, 6/30    7. The Art of Change 7/5    8. Relapse Prevention 7/10, 7/11, 7/14    9. Relationships/Boundaries 7/17, 7/18, 7/21 Addictive Relationships   10. Grief and Loss 7/25    11. Strengths 7/31, 8/1, 8/4    12. Dual Recovery Planning     Seeking Safety Unit every Tuesday 6/6, 6/13, 6/27, 7/11, 7/18, 7/25, 8/1    Mindfulness every Friday 6/9, 6/30, 7/14, 7/21, 8/4    OT/RT every Wednesday for one " hour

## 2021-06-12 NOTE — PROGRESS NOTES
Weekly Progress Note  Polina Cardozath  1985  [id number]      D) Pt attended 3 groups  this week with 1 absence, but also arrived late to group on 2 days. A) Staff facilitated groups and reviewed tx progress. Assessed for [facility]. R) No VAP needed at this time. Pt working on the following dimensions:  Dimension #1 - Withdrawal Potential - Risk 0, no concerns. Patient reported drinking at the time of OD, but has returned to abstinence (Method 1).  Dimension #2 - Biomedical - Risk 1, mild concerns. Patient reported a bulimic episode on 7/1/17, and counselor and patient processed this event, and discussed what the patient can learn from this. Patient reported no bulimic episodes this week, no other changes to her heatlh (Method 1).   Dimension #3 - Emotional/Behavioral/Cognitive - Risk 2, moderate concerns. Patient reports continuing to work with therapist and psychiatrist. Patient reported that her therapist will be referring her to a DBT program. Patient reported intentional overdose on 7/7/17, at which time she was hospitalized at [facility] (see documentation). Patient reports she has not reached out to her therapist yet, which patient's counselor strongly encouraged her to do so. Patient reports that she has not been on her medication since the overdose, but will get back on them within a week, and that her father will be holding on her medication, per her safety plan (Method 1).   Dimension #4 - Treatment Acceptance/Resistance - Risk 0, no concerns. Patient actively participated when present, but did not communicate with staff regarding absence on 7/19/17, and arrived late to group twice (Method 1). Staff to encouraged patient to communicate with staff when she is unable to attend.   Dimension #5 - Relapse Potential - Risk 3, serious concerns. Patient reports that she constantly has urges and addictive thinking, but has had continued abstinence since 7/7/17. Patient reports that she is trying to keep busy  and reach out ot people.   Dimension #6 - Recovery Environment - Risk 2, moderate concerns. Patient reports working part-time and that she has support from her work and father, but is looking into more supportive work options. Patient continues to report plan to attend AA group at [facility], but has not done so. Patient discussed in group that her ex-significant other came to see her and she was able to ask him to leave and told him that if he comes to see her again she will file an order of protection.   T) Client educated on Relationships. Client has completed 44 of 90 hours of program at this time. Projected discharge date is 9/8/17. Current discharge plan is referral to Relapse Prevention.       Sunita Rincon [facility], [facility]  7/23/2017, 1:06 PM       Weekly Educational Topics Date Education   1. Understanding Dual Diagnoses, week 1         Understanding Dual Diagnoses, week 2     2. Coping with Stress     3. Managing Difficult Feelings 6/5, 6/6, 6/7, 6/9    4. Building Recovery Support 6/12, 6/13, 6/14    5. Managing Thinking Problems     6. Developing Assertive Communication Skills 6/26, 6/27, 6/28, 6/30    7. The Art of Change 7/5    8. Relapse Prevention 7/10, 7/11, 7/14    9. Relationships/Boundaries 7/17, 7/18, 7/21 Addictive Relationships   10. Grief and Loss     11. Strengths     12. Dual Recovery Planning     Seeking Safety Unit every Tuesday 6/6, 6/13, 6/27, 7/11, 7/18    Mindfulness every Friday 6/9, 6/30, 7/14, 7/21    OT/RT every Wednesday for one hour

## 2021-06-12 NOTE — PROGRESS NOTES
*Late entry  D. Counselor and patient met to discuss treatment progress. A. Counselor inquired about patient's experiences as her father's caregiver, discussed setting boundaries and having realistic expectations. Counselor also provided patient resources on [facility]'s caregiver resources and the importance of self-care. Counselor also inquired about patient's current mental health services and encouraged patient to reach out to the [facility] program. Counselor and patient discussed patient cutting down to 2 days per week, and counselor strongly encouraged patient to work on treatment assignments during that time and practice self-care. R. Patient reported extreme difficulty coping with caregiving for her father, and discussed that her father will often yell at her or not accept food that she has made. Patient appeared receptive about setting boundaries with her father and calling [facility]. Patient reported having an appointment for psychiatry on 8/17/17 and that she would inquired about the DBT referral. Patient reported that she has been struggling with eating, and reported that she would contact the [facility]. Patient reported understanding plan to work on treatment assignments and cutting back her hours to twice per week. T. Counselor to follow up with patient regarding days she plans to attend.     JODEE Velasco, [facility]  8/17/2017, 3:20 PM

## 2021-06-12 NOTE — PROGRESS NOTES
Weekly Progress Note  Karin Cardoza  1985  [id number]      D) Pt attended ZERO groups  this week with 2 absences. A) Staff facilitated groups and reviewed tx progress. Assessed for VA. R) Unable to assess along six dimensions or for VA due to lack of attendance.   T) Client has completed 65 of 90 hours of program at this time. Projected discharge date is 9/8/17. Current discharge plan is referral to Relapse Prevention.       Sunita Rincon, MPS, [facility]  9/1/2017, 3:20 PM            Weekly Educational Topics Date Education   1. Understanding Dual Diagnoses, week 1 8/15        Understanding Dual Diagnoses, week 2 8/22    2. Coping with Stress     3. Managing Difficult Feelings 6/5, 6/6, 6/7, 6/9    4. Building Recovery Support 6/12, 6/13, 6/14    5. Managing Thinking Problems     6. Developing Assertive Communication Skills 6/26, 6/27, 6/28, 6/30    7. The Art of Change 7/5    8. Relapse Prevention 7/10, 7/11, 7/14    9. Relationships/Boundaries 7/17, 7/18, 7/21 Addictive Relationships   10. Grief and Loss 7/25    11. Strengths 7/31, 8/1, 8/4    12. Wellness 8/11    Seeking Safety Unit every Tuesday 6/6, 6/13, 6/27, 7/11, 7/18, 7/25, 8/1, 8/15, 8/22    Mindfulness every Friday 6/9, 6/30, 7/14, 7/21, 8/4    OT/RT every Wednesday for one hour

## 2021-06-12 NOTE — PROGRESS NOTES
[facility]   Mental Health and Addiction Care   [facility], [facility], and [facility]    [phone] or [phone]   Treatment Plan Update      Patient  Name: Karin Cardoza  MRN: [id number]   Counselor: JODEE Velasco, [facility]        Title: Dimension 1 Withdrawal Potential, Risk level 0, no concerns  Plan Date: 8/1/2017  Problem: Patient reports date of last use of alcohol on 4/25/17, last use of marijuana on 5/26/17. Patient does not report or display any withdrawal concerns at this time.     Goal: Begin Date: 8/1/2017    Target Date: 9/8/17  Maintain abstinence throughout treatment in order to avoid experiencing withdrawal symptoms and further your recovery    Method 1: Begin Date:8/1/2017   Target Date: 9/8/17  Date Completed:   Patient to report any substance use to counselor to determine what changes need to be made for the you to be successful in recovery.         Title: Dimension 2 Biomedical condition, Risk level 1, mild concerns  Plan Date: 8/1/2017  Problem: Patient reports no current biomedical concerns, but also reports history of bulimia.     Goal: Begin Date: 8/1/2017    Target Date: 9/8/17  To maintain stable physical health to participate in treatment services, while also making healthy changes to support ongoing recovery.     Method 1: Begin Date: 8/1/2017   Target Date: 9/8/17  Date Completed:   Patient to report any changes in physical health that would impact treatment participation.     Is Nicotine use indicated on the assessment? yes  Method 2: Begin Date: 6/8/2017   Target Date: 9/8/17  Date Completed:   Staff to provide Pt with nicotine cessation information and help on how to quit use. Pt to report progress on stopping nicotine use to staff.     Method 3: Begin Date: 6/8/2017   Target Date: 9/8/17  Date Completed:   Patient to report to primary counselor urges and symptoms related to bulimia, to determine if you have any additional treatment needs in this area.          Title: Dimension 3, Emotional, behavioral, cognitive condition, Risk level 2, moderate concerns  Plan Date: 8/1/2017  Problem: Patient reports history of mental health diagnoses of BPD, bipolar, anxiety and depression and reports that she is re-engaging with her mental health providers. Patient reports difficulty managing emotions and low self worth.     Goal: Begin Date: 8/1/2017    Target Date: 9/8/17   To better understand the relationship between mental health and chemical use.     Method 1: Begin Date:8/1/2017   Target Date: 9/8/17   Date Completed:   Patient to attend all mental health appointments, share in group how you feel that this is helping or not helping you.      Method 2: Begin Date: 8/1/2017   Target Date: 9/8/17   Date Completed:   Patient to participate in daily check-in, discussing your thoughts and feelings to develop better understanding of your mental health symptoms.    Method 3: Begin Date: 8/1/2017  Target Date: 9/8/17   Date Completed:   Patient to identify 5 negative things that you say about yourself, and identify ways that you can challenge these thoughts. Share with counselor.     Method 4: Begin Date: 8/1/2017  Target Date: 9/8/17   Date Completed:   Patient to read over DBT social skills sheets and identify 3 ways that you can apply these skills in your relationships. Share with counselor.         Title: Dimension 4, Treatment Acceptance/Resistance, Risk level 0, no concerns  Plan Date: 8/1/2017  Problem: Patient reports no external motivation, appears to have a strong desire for recovery    Goal: Begin Date: 8/1/2017    Target Date: 9/8/17  Patient to participate in MICD treatment as a way to enhance motivation towards recovery.     Method 1: Begin Date: 8/1/2017   Target Date: 9/8/17   Date Completed:   Patient to participate in all scheduled groups and inform staff if unable to attend ([facility]: [phone])        Title: Dimension 5, Relapse potential, Risk level 3,  "serious concerns  Plan Date: 8/1/2017    Problem: Patient reports problematic alcohol use over the past several years, and identifies it has been the primary way that she has dealt with negative emotions. Patient reports no prior chemical health treatments.     Goal: Begin Date: 8/1/2017    Target Date: 9/8/17  Patient to report better understanding of urges and triggers, and coping skills to maintain abstinence throughout outpatient treatment.     Method 1: Begin Date: 8/1/2017   Target Date: 9/8/17   Date Completed:   Patient to participate in daily check-in and share any urges and addictive things in order to gain more insight into your pattern of use.     Method 2: Begin Date: 8/1/2017   Target Date: 9/8/17   Date Completed:   Patient to develop at least 5 \"game plans\" for what she will do when feeling triggered to binge on food or to drink. Share with counselor during 1:1, ask if you are needing help coming up with plans.          Title: Dimension 6, Recovery Environment, Risk level 2, moderate concerns  Plan Date:  8/1/2017  Problem: Patient reports that she is currently living with her father and working park time. Patient reports that she is re-engaging with her hobbies, and has begun attending AA. Patient reports some support from family and friends, and describes her ex-significant other as not supportive. Patient reports no current legal concerns.     Goal: Begin Date: 8/1/2017    Target Date: 9/8/17  Patient to make changes in her environment that will support ongoing recovery.     Method 1: Begin Date: 8/1/2017   Target Date: 9/8/17   Date Completed:   Patient to identify 5 goals that she has for the next six months, and identify what steps you need to take towards these goals. Share in group/with counselor any successes or challenges.     Method 2: Begin Date: 8/1/2017   Target Date: 9/8/17    Date Completed:   Patient to continue at least weekly AA/recovery group participation, share any benefits that " you are experience through participation in the recovery community.           By signing this document, I am acknowledging that I was actively and directly involved in the development of my treatment plan.     Patient  Signature_________________________________________         Date__________________        Staff Signature  JODEE Velasco,  [facility]     Date: 8/1/2017, 8:31 AM

## 2021-06-12 NOTE — PROGRESS NOTES
Weekly Progress Note  Karin Cardoza  1985  [id number]      D) Pt attended 1 groups  this week so far with excused absences, also met individually with counselor. A) Staff facilitated groups and reviewed tx progress. Assessed for VA. R) No VAP needed at this time. Pt working on the following dimensions:  Dimension #1 - Withdrawal Potential - Risk 0, no concerns. Patient reported drinking at the time of OD, but has returned to abstinence (Method 1).  Dimension #2 - Biomedical - Risk 1, mild concerns. Patient reported a bulimic episode on 7/1/17, and counselor and patient processed this event, and discussed what the patient can learn from this. Patient reported no bulimic episodes this week, but did report that she was struggling with overeating, reported plan to contact [facility], no other changes to her health (Method 1/Method 3).   Dimension #3 - Emotional/Behavioral/Cognitive - Risk 2, moderate concerns. Patient reports continuing to work with therapist and psychiatrist. Patient reported that her therapist will be referring her to a DBT program. Patient reported intentional overdose on 7/7/17, at which time she was hospitalized at [facility] (see documentation). Patient reports she has not reached out to her therapist yet, which patient's counselor strongly encouraged her to do so. Patient reports that she has not been on her medication since the overdose, but will get back on them within a week, and that her father will be holding on her medication, per her safety plan (Method 1). Patient's DA was received on 8/9/17. Patient reported psychiatry appointment on 8/17/17 and reported she planned to inquired into DBT referral.  Patient reported she hadn't began working on Method 3 & 4 yet. Patient reported difficulty coping with her father's care this past week (see 1:1 documentation)  Dimension #4 - Treatment Acceptance/Resistance - Risk 0, no concerns. Patient only attended 1 group this week,but  contacted staff about absence (Method 1).   Dimension #5 - Relapse Potential - Risk 3, serious concerns. Patient reports that she constantly has urges and addictive thinking, but has had continued abstinence since 7/7/17. Patient reports that she is trying to keep busy and reach out ot people. Patient discussed having continued urges related to interacting with her father.   Dimension #6 - Recovery Environment - Risk 2, moderate concerns. Patient reports working part-time and living with her father. Patient continues to report plan to attend AA group at [facility], but has not done so. Patient reported that she is continuing to struggle with caring for her father, was encouraged by counselor to set boundaries, was provided information about caregiving support from [facility].  Patient reported plan to work on her goals (Method 1).   T) Client educated on Dual Diagnosis I.  Client has completed 62 of 90 hours of program at this time. Projected discharge date is 9/8/17. Current discharge plan is referral to Relapse Prevention.       Sunita Rincon, [facility], [facility]  8/17/2017, 3:48 PM       Weekly Educational Topics Date Education   1. Understanding Dual Diagnoses, week 1 8/15        Understanding Dual Diagnoses, week 2     2. Coping with Stress     3. Managing Difficult Feelings 6/5, 6/6, 6/7, 6/9    4. Building Recovery Support 6/12, 6/13, 6/14    5. Managing Thinking Problems     6. Developing Assertive Communication Skills 6/26, 6/27, 6/28, 6/30    7. The Art of Change 7/5    8. Relapse Prevention 7/10, 7/11, 7/14    9. Relationships/Boundaries 7/17, 7/18, 7/21 Addictive Relationships   10. Grief and Loss 7/25    11. Strengths 7/31, 8/1, 8/4    12. Wellness 8/11    Seeking Safety Unit every Tuesday 6/6, 6/13, 6/27, 7/11, 7/18, 7/25, 8/1, 8/15    Mindfulness every Friday 6/9, 6/30, 7/14, 7/21, 8/4    OT/RT every Wednesday for one hour

## 2021-06-12 NOTE — PROGRESS NOTES
Weekly Progress Note  Karin Cardoza  1985  [id number]      D) Pt attended 1 groups  this week with 1 absences. A) Staff facilitated groups and reviewed tx progress. Assessed for VA. R) Unable to assess along six dimensions or for VA due to this writer not being present when patient attended group  T) Client educated on Dual Diagnosis II.  Client has completed 65 of 90 hours of program at this time. Projected discharge date is 9/8/17. Current discharge plan is referral to Relapse Prevention.       JODEE Velasco, [facility]  8/25/2017, 4:21 PM        Weekly Educational Topics Date Education   1. Understanding Dual Diagnoses, week 1 8/15        Understanding Dual Diagnoses, week 2 8/22    2. Coping with Stress     3. Managing Difficult Feelings 6/5, 6/6, 6/7, 6/9    4. Building Recovery Support 6/12, 6/13, 6/14    5. Managing Thinking Problems     6. Developing Assertive Communication Skills 6/26, 6/27, 6/28, 6/30    7. The Art of Change 7/5    8. Relapse Prevention 7/10, 7/11, 7/14    9. Relationships/Boundaries 7/17, 7/18, 7/21 Addictive Relationships   10. Grief and Loss 7/25    11. Strengths 7/31, 8/1, 8/4    12. Wellness 8/11    Seeking Safety Unit every Tuesday 6/6, 6/13, 6/27, 7/11, 7/18, 7/25, 8/1, 8/15, 8/22    Mindfulness every Friday 6/9, 6/30, 7/14, 7/21, 8/4    OT/RT every Wednesday for one hour
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
## 2021-06-13 NOTE — PROGRESS NOTES
Weekly Progress Note  Karin Cardoza  1985  [id number]      D) Pt attended ZERO groups  this week with 2 unexcused absence. A) Staff facilitated groups and reviewed tx progress. Assessed for VA. R) Unable to assess along six dimensions or for VA due to lack of attendance.   T) Client has completed 66 of 90 hours of program at this time. Projected discharge date is TBD. Current discharge plan is referral to [facility]. If threre is no contact with patient in the next week, patient will be discharged JODEE Delgado, [facility]  9/23/2017, 11:01 AM            Weekly Educational Topics Date Education   1. Understanding Dual Diagnoses, week 1 8/15        Understanding Dual Diagnoses, week 2 8/22    2. Coping with Stress     3. Managing Difficult Feelings 6/5, 6/6, 6/7, 6/9    4. Building Recovery Support 6/12, 6/13, 6/14    5. Managing Thinking Problems     6. Developing Assertive Communication Skills 6/26, 6/27, 6/28, 6/30    7. The Art of Change 7/5    8. Relapse Prevention 7/10, 7/11, 7/14    9. Relationships/Boundaries 7/17, 7/18, 7/21 Addictive Relationships   10. Grief and Loss 7/25    11. Strengths 7/31, 8/1, 8/4    12. Wellness 8/11    Seeking Safety Unit every Tuesday 6/6, 6/13, 6/27, 7/11, 7/18, 7/25, 8/1, 8/15, 8/22    Mindfulness every Friday 6/9, 6/30, 7/14, 7/21, 8/4    OT/RT every Wednesday for one hour

## 2021-06-13 NOTE — PROGRESS NOTES
Weekly Progress Note  Karin Cardoza  1985  [id number]      D) Pt attended ZERO groups  this week with 1 absences as of this date. A) Staff facilitated groups and reviewed tx progress. Assessed for VA. R) Unable to assess along six dimensions or for VA due to lack of attendance.   T) Client has completed 65 of 90 hours of program at this time. Projected discharge date is TBD. Current discharge plan is referral to [facility]. Patient was staffed, and counselor will continue to work with patient on attendance and meeting individually with counselor instead of group as necessary.       Sunita Rincon, [facility]  9/14/2017, 5:03 PM            Weekly Educational Topics Date Education   1. Understanding Dual Diagnoses, week 1 8/15        Understanding Dual Diagnoses, week 2 8/22    2. Coping with Stress     3. Managing Difficult Feelings 6/5, 6/6, 6/7, 6/9    4. Building Recovery Support 6/12, 6/13, 6/14    5. Managing Thinking Problems     6. Developing Assertive Communication Skills 6/26, 6/27, 6/28, 6/30    7. The Art of Change 7/5    8. Relapse Prevention 7/10, 7/11, 7/14    9. Relationships/Boundaries 7/17, 7/18, 7/21 Addictive Relationships   10. Grief and Loss 7/25    11. Strengths 7/31, 8/1, 8/4    12. Wellness 8/11    Seeking Safety Unit every Tuesday 6/6, 6/13, 6/27, 7/11, 7/18, 7/25, 8/1, 8/15, 8/22    Mindfulness every Friday 6/9, 6/30, 7/14, 7/21, 8/4    OT/RT every Wednesday for one hour

## 2021-06-13 NOTE — PROGRESS NOTES
[facility] CHEMICAL DEPENDENCY   DISCHARGE SUMMARY       NAME:  Karin Cardoza   Physician:  Dr. Shah      MRN:  [id number] :  JODEE Velasco,  [facility]#:  [id number] Funding Source:  [facility]   Admit Date: 17 Discharge Date: 2017     :  1985 Hours Completed: 66   Initial Diagnosis: Alcohol Use Disorder, Severe (F10.20), Cannabis Use Disorder, Moderate (F12.20)   Final Diagnosis: Alcohol Use Disorder, Severe (F10.20), Cannabis Use Disorder, Moderate (F12.20)   Discharge Address:    [address] [place] [address]        Discharge Type:  Absent Without Leave (AWOL)    Reasons for and circumstances of service termination:   Karin has completed 66 hours of MICD outpatient treatment. Pt has unexcused absences on throughout treatment, but has not met with staff or attended group since 17, and last made contact with staff on 9/15/17, with plan to return to group the following week. It appears that Pt is no longer interested in engaging in treatment services at this time. Pt discharged as of 17, AWOL.       Dimension/Course of Treatment/Individualized Care:   1. Withdrawal Potential - Unable to assess at discharge due to lack of contact with staff. Last known risk ratings as of 17 as follows: Risk 0, no concerns. Patient reported drinking last week on 17 and 17, but reports has returned to abstinence. Staff to continue to monitor.        2. Biomedical Conditions and Complications - Unable to assess at discharge due to lack of contact with staff. Last known risk ratings as of 17 as follows: Risk 1, mild concerns. Patient reported a bulimic episode on 17, and counselor and patient processed this event, and discussed what the patient can learn from this. Patient reported no bulimic episodes this week, and did not report any contact with the [facility]. Patient reported that she has been oversleeping this week, which she attributes to her  depression       3. Emotional/Behavioral/Cognitive Conditions and Complications - Unable to assess at discharge due to lack of contact with staff. Last known risk ratings as of 9/8/17 as follows: Risk 2, moderate concerns. Patient reports continuing to work with therapist and psychiatrist. Patient reported that her therapist will be referring her to a DBT program. Patient reported intentional overdose on 7/7/17, at which time she was hospitalized at [facility] (see documentation). Patient reports she has not reached out to her therapist yet, which patient's counselor strongly encouraged her to do so. Patient reports that she has not been on her medication since the overdose, but will get back on them within a week, and that her father will be holding on her medication, per her safety plan (Method 1). Patient's DA was received on 8/9/17. Patient reported psychiatry appointment on 8/17/17 and reported she planned to inquired into DBT referral.  Patient reported she hadn't began working on Method 3 & 4 yet. Patient reported continued difficulty coping with her father's care this past week (see 1:1 documentation) Patient reports this week significant difficulty coping with emotions and that she has been primarily sleeping. Counselor encouraged patient to try to small activities and opposite action to her feelings.        4. Treatment Acceptance/Resistance - Unable to assess at discharge due to lack of contact with staff. Last known risk ratings as of 9/8/17 as follows: Risk 0, no concerns. Patient did not attend group this week, and contacted counselor after the fact. (Method 1). Counselor and patient discussed during 1:1 the importance of attendance, patient has had very limited attendance in the past month.        5. Relapse/Continued Use/Continued Problem Potential Risk level - Unable to assess at discharge due to lack of contact with staff. Last known risk ratings as of 9/8/17 as follows: Risk 3, serious  concerns. Patient alcohol in the past week, which she attributed to continued stress with her father and a negative relationship (Method 1). Patient reported plan to take action in these areas and work on self care.        6. Recovery Environment  - Unable to assess at discharge due to lack of contact with staff. Last known risk ratings as of 9/8/17 as follows: Risk 2, moderate concerns. Patient reports working part-time and living with her father. Patient continues to report plan to attend AA group at [facility], but has not done so. Patient reported that she is continuing to struggle with caring for her father, was encouraged by counselor to set boundaries, was provided information about caregiving support from [facility], but has not contacted organization..  Patient reported plan to work on her goals (Method 1). Patient reported some interest in sober housing this week.        Services Provided: Intake, assessment, treatment planning, education, group discussion, film, lectures, and recommendation at discharge.     Strengths: Humility, sociable, sense of humor.      Needs: self-confidence, socializing skills, self-loathing thoughts/negative ruminating, stubbornness.        Program Involvement: Fair  Attendance: Poor  Ability to relate in group/   Other program activities: Fair  Assignment Completion: Fair  Overall Behavior: Fair  Reported Family/Significant   Other Involvement: Poor    Prognosis: Poor      Recommendations       Complete chemical health assessment to determine appropriate level of care.     Mental Health Referral  Continue with current mental health services, as previously reported.       Physical Health Referral:  Personal Physician                Counselor Name and Title:  JODEE Velasco, [facility]    Date:  10/2/2017  Time:  2:36 PM
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
## 2021-08-19 NOTE — PROGRESS NOTES
Weekly Progress Note  ArunTegan aguerobeth  1985  [id number]      D) Pt attended 3 groups  this week with 1 expected absence, also met individually with counselor. A) Staff facilitated groups and reviewed tx progress. Assessed for [facility]. R) No VAP needed at this time. Pt working on the following dimensions:  Dimension #1 - Withdrawal Potential - Risk 0, no concerns. Patient reported drinking at the time of OD, but has returned to abstinence (Method 1).  Dimension #2 - Biomedical - Risk 1, mild concerns. Patient reported a bulimic episode on 7/1/17, and counselor and patient processed this event, and discussed what the patient can learn from this. Patient reported no bulimic episodes this week, no other changes to her health (Method 1).   Dimension #3 - Emotional/Behavioral/Cognitive - Risk 2, moderate concerns. Patient reports continuing to work with therapist and psychiatrist. Patient reported that her therapist will be referring her to a DBT program. Patient reported intentional overdose on 7/7/17, at which time she was hospitalized at [facility] (see documentation). Patient reports she has not reached out to her therapist yet, which patient's counselor strongly encouraged her to do so. Patient reports that she has not been on her medication since the overdose, but will get back on them within a week, and that her father will be holding on her medication, per her safety plan (Method 1). Patient has not completed any other methods this past week, patient's DA was received on 8/9/17.   Dimension #4 - Treatment Acceptance/Resistance - Risk 1, mild concerns. Patient only attended 1 group this week, arriving late, and only contacted staff regarding 1 absence (Method 1).   Dimension #5 - Relapse Potential - Risk 3, serious concerns. Patient reports that she constantly has urges and addictive thinking, but has had continued abstinence since 7/7/17. Patient reports that she is trying to keep busy and reach out ot  people. Patient discussed having urges related to interacting with her father.   Dimension #6 - Recovery Environment - Risk 2, moderate concerns. Patient reports working part-time and living with her father. Patient continues to report plan to attend AA group at [facility], but has not done so. Patient reported that she is continuing to struggle with caring for her father, was encouraged by group to set boundaries.  Patient reported plan to work on her goals (Method 1).   T) Client educated on Wellness. Client has completed 58 of 90 hours of program at this time. Projected discharge date is 9/8/17. Current discharge plan is referral to Relapse Prevention.       JODEE Velasco, [facility]  8/13/2017, 12:35 PM       Weekly Educational Topics Date Education   1. Understanding Dual Diagnoses, week 1         Understanding Dual Diagnoses, week 2     2. Coping with Stress     3. Managing Difficult Feelings 6/5, 6/6, 6/7, 6/9    4. Building Recovery Support 6/12, 6/13, 6/14    5. Managing Thinking Problems     6. Developing Assertive Communication Skills 6/26, 6/27, 6/28, 6/30    7. The Art of Change 7/5    8. Relapse Prevention 7/10, 7/11, 7/14    9. Relationships/Boundaries 7/17, 7/18, 7/21 Addictive Relationships   10. Grief and Loss 7/25    11. Strengths 7/31, 8/1, 8/4    12. Wellness 8/11    Seeking Safety Unit every Tuesday 6/6, 6/13, 6/27, 7/11, 7/18, 7/25, 8/1    Mindfulness every Friday 6/9, 6/30, 7/14, 7/21, 8/4    OT/RT every Wednesday for one hour                yes

## 2023-03-05 ENCOUNTER — APPOINTMENT (OUTPATIENT)
Dept: CT IMAGING | Facility: CLINIC | Age: 38
End: 2023-03-05
Payer: COMMERCIAL

## 2023-03-05 ENCOUNTER — HOSPITAL ENCOUNTER (EMERGENCY)
Facility: CLINIC | Age: 38
Discharge: HOME | End: 2023-03-05
Attending: EMERGENCY MEDICINE | Admitting: EMERGENCY MEDICINE
Payer: COMMERCIAL

## 2023-03-05 VITALS
RESPIRATION RATE: 20 BRPM | SYSTOLIC BLOOD PRESSURE: 132 MMHG | OXYGEN SATURATION: 97 % | WEIGHT: 112 LBS | TEMPERATURE: 98.2 F | BODY MASS INDEX: 19.84 KG/M2 | HEART RATE: 90 BPM | DIASTOLIC BLOOD PRESSURE: 90 MMHG

## 2023-03-05 DIAGNOSIS — F10.929 ALCOHOL INTOXICATION (H): ICD-10-CM

## 2023-03-05 DIAGNOSIS — Z87.898 HISTORY OF SEIZURE: ICD-10-CM

## 2023-03-05 DIAGNOSIS — F41.9 ANXIETY: ICD-10-CM

## 2023-03-05 LAB
ALBUMIN SERPL-MCNC: 3.7 G/DL (ref 3.5–5)
ALBUMIN UR-MCNC: 10 MG/DL
ALP SERPL-CCNC: 114 U/L (ref 45–120)
ALT SERPL W P-5'-P-CCNC: 35 U/L (ref 0–45)
AMPHETAMINES UR QL SCN: NORMAL
ANION GAP SERPL CALCULATED.3IONS-SCNC: 12 MMOL/L (ref 5–18)
APPEARANCE UR: CLEAR
AST SERPL W P-5'-P-CCNC: 85 U/L (ref 0–40)
BACTERIA #/AREA URNS HPF: ABNORMAL /HPF
BARBITURATES UR QL: NORMAL
BENZODIAZ UR QL: NORMAL
BILIRUB SERPL-MCNC: 0.5 MG/DL (ref 0–1)
BILIRUB UR QL STRIP: NEGATIVE
BUN SERPL-MCNC: 8 MG/DL (ref 8–22)
CALCIUM SERPL-MCNC: 8.6 MG/DL (ref 8.5–10.5)
CANNABINOIDS UR QL SCN: NORMAL
CHLORIDE BLD-SCNC: 106 MMOL/L (ref 98–107)
CO2 SERPL-SCNC: 22 MMOL/L (ref 22–31)
COCAINE UR QL: NORMAL
COLOR UR AUTO: ABNORMAL
CREAT SERPL-MCNC: 0.59 MG/DL (ref 0.6–1.1)
CREAT UR-MCNC: 120 MG/DL
ERYTHROCYTE [DISTWIDTH] IN BLOOD BY AUTOMATED COUNT: 12.3 % (ref 10–15)
ETHANOL SERPL-MCNC: 205 MG/DL
GFR SERPL CREATININE-BSD FRML MDRD: >90 ML/MIN/1.73M2
GLUCOSE BLD-MCNC: 84 MG/DL (ref 70–125)
GLUCOSE UR STRIP-MCNC: NEGATIVE MG/DL
HCG UR QL: NEGATIVE
HCT VFR BLD AUTO: 36.8 % (ref 35–47)
HGB BLD-MCNC: 12.8 G/DL (ref 11.7–15.7)
HGB UR QL STRIP: ABNORMAL
KETONES UR STRIP-MCNC: NEGATIVE MG/DL
LEUKOCYTE ESTERASE UR QL STRIP: ABNORMAL
MAGNESIUM SERPL-MCNC: 1.7 MG/DL (ref 1.8–2.6)
MCH RBC QN AUTO: 34 PG (ref 26.5–33)
MCHC RBC AUTO-ENTMCNC: 34.8 G/DL (ref 31.5–36.5)
MCV RBC AUTO: 98 FL (ref 78–100)
MUCOUS THREADS #/AREA URNS LPF: PRESENT /LPF
NITRATE UR QL: NEGATIVE
OPIATES UR QL SCN: NORMAL
OXYCODONE UR QL: NORMAL
PCP UR QL SCN: NORMAL
PH UR STRIP: 7.5 [PH] (ref 5–7)
PLATELET # BLD AUTO: 208 10E3/UL (ref 150–450)
POTASSIUM BLD-SCNC: 3.8 MMOL/L (ref 3.5–5)
PROT SERPL-MCNC: 7.4 G/DL (ref 6–8)
RBC # BLD AUTO: 3.76 10E6/UL (ref 3.8–5.2)
RBC URINE: 1 /HPF
SODIUM SERPL-SCNC: 140 MMOL/L (ref 136–145)
SP GR UR STRIP: 1.02 (ref 1–1.03)
SQUAMOUS EPITHELIAL: 10 /HPF
TSH SERPL DL<=0.005 MIU/L-ACNC: 0.47 UIU/ML (ref 0.3–5)
UROBILINOGEN UR STRIP-MCNC: <2 MG/DL
WBC # BLD AUTO: 4.3 10E3/UL (ref 4–11)
WBC URINE: 6 /HPF

## 2023-03-05 PROCEDURE — 81001 URINALYSIS AUTO W/SCOPE: CPT | Mod: XU | Performed by: PHYSICIAN ASSISTANT

## 2023-03-05 PROCEDURE — 82077 ASSAY SPEC XCP UR&BREATH IA: CPT | Performed by: PHYSICIAN ASSISTANT

## 2023-03-05 PROCEDURE — 99284 EMERGENCY DEPT VISIT MOD MDM: CPT | Mod: 25

## 2023-03-05 PROCEDURE — 85027 COMPLETE CBC AUTOMATED: CPT | Performed by: PHYSICIAN ASSISTANT

## 2023-03-05 PROCEDURE — 83735 ASSAY OF MAGNESIUM: CPT | Performed by: PHYSICIAN ASSISTANT

## 2023-03-05 PROCEDURE — 84443 ASSAY THYROID STIM HORMONE: CPT | Performed by: PHYSICIAN ASSISTANT

## 2023-03-05 PROCEDURE — 250N000013 HC RX MED GY IP 250 OP 250 PS 637: Performed by: PHYSICIAN ASSISTANT

## 2023-03-05 PROCEDURE — 80307 DRUG TEST PRSMV CHEM ANLYZR: CPT | Performed by: PHYSICIAN ASSISTANT

## 2023-03-05 PROCEDURE — 81025 URINE PREGNANCY TEST: CPT | Performed by: PHYSICIAN ASSISTANT

## 2023-03-05 PROCEDURE — 87086 URINE CULTURE/COLONY COUNT: CPT | Performed by: PHYSICIAN ASSISTANT

## 2023-03-05 PROCEDURE — 36415 COLL VENOUS BLD VENIPUNCTURE: CPT | Performed by: PHYSICIAN ASSISTANT

## 2023-03-05 PROCEDURE — 70450 CT HEAD/BRAIN W/O DYE: CPT

## 2023-03-05 PROCEDURE — 80053 COMPREHEN METABOLIC PANEL: CPT | Performed by: PHYSICIAN ASSISTANT

## 2023-03-05 PROCEDURE — 80175 DRUG SCREEN QUAN LAMOTRIGINE: CPT | Performed by: PHYSICIAN ASSISTANT

## 2023-03-05 RX ORDER — LORAZEPAM 1 MG/1
1 TABLET ORAL ONCE
Status: COMPLETED | OUTPATIENT
Start: 2023-03-05 | End: 2023-03-05

## 2023-03-05 RX ADMIN — LORAZEPAM 1 MG: 1 TABLET ORAL at 18:28

## 2023-03-05 ASSESSMENT — ACTIVITIES OF DAILY LIVING (ADL): ADLS_ACUITY_SCORE: 35

## 2023-03-05 NOTE — ED PROVIDER NOTES
"ED PROVIDER NOTE    EMERGENCY DEPARTMENT ENCOUNTER      NAME: Karin Cardoza  AGE: 37 year old female  YOB: 1985  MRN: [id number]  EVALUATION DATE & TIME: 3/5/2023  5:07 PM    PCP: [facility]    ED PROVIDER: Nunu Dickey PA-C      Chief Complaint   Patient presents with     Seizures         FINAL IMPRESSION:  1. Alcohol intoxication (H)    2. Anxiety    3. History of seizure          MEDICAL DECISION MAKING:    Pertinent Labs & Imaging studies reviewed. (See chart for details)    38yo female with a h/o anxiety, panic attacks, bipolar, prior alcohol use disorder presenting with seizure activity.  Patient staffed with Dr. Smith.     Patient reports that she has a history of a seizure disorder when she was in her adolescent years which resolved and she is no longer followed by neurologist or on any epileptic medication.  She had her first seizure activity since she was a child about a month ago.  She has since had several episodes over the last 3 days.  These episodes are described as getting anxious and panicking and then passing out and having \"seizure-like\" activity for approximately 45 seconds to a minute.  She has urinated herself twice with these episodes.    She denies any new medication changes.  She does follow regularly with a psychiatrist but her last visit with them was a month or 2 ago.    Here vitals are stable, she is afebrile.  Clinically appears well, nontoxic.  She is neuro intact.  She apparently had seizure activity upon arrival in her ER room for approximately 1 minute and was shaking and not reactive.  Her  was asked by a staff member if she had a history of seizures and he said he did not know, then the patient awoke and told her  \"you know I have a history of seizures\".      Obtained workup here including head CT, UA, labs including UDS and alcohol level.    Workup overall unremarkable other than -OH level was 205. Considered alcohol w/drawal " Per Dr. Mccallum, start Ampicillin once ann balloon is out.   seizure but this seems less likely. She does not exhibit any other signs of w/drawl today.  The activity she is describing does not sound like true seizure activity. It sounds more anxiety/panic driven LOC and then some myotonic jerking.     I do not feel she warrants admission or further workup in the ER today. Did place neurology referral and encouraged her to reach out to her pcp and psychiatrist for an appt in the next week. We discussed lessening alcohol use. Signs and symptoms that should prompt return to the ER were explained. Patient understood and was comfortable with plan.       At the conclusion of the encounter I discussed the results of all of the tests and the disposition. The questions were answered. The patient or family acknowledged understanding and was agreeable with the care plan.         Medical Decision Making    History:    Supplemental history from: Documented in chart, if applicable and Family Member/Significant Other    External Record(s) reviewed: Documented in chart, if applicable.    Work Up:    Chart documentation includes differential considered and any EKGs or imaging independently interpreted by provider, where specified.    In additional to work up documented, I considered the following work up: Documented in chart, if applicable.    External consultation:    Discussion of management with another provider: Documented in chart, if applicable    Complicating factors:    Care impacted by chronic illness: Other: panic attacks, anxiety    Care affected by social determinants of health: Alcohol use    Disposition considerations: Discharge. No recommendations on prescription strength medication(s). See documentation for any additional details.          MEDICATIONS GIVEN IN THE EMERGENCY:  Medications - No data to display    NEW PRESCRIPTIONS STARTED AT TODAY'S ER VISIT  New Prescriptions    No medications on file       "    =================================================================    HPI    Patient information was obtained from: Patient    Use of : N/A        Karin Cardoza is a 37 year old female with a pertinenet medical history of seizures, bipolar 1 disorder who presents to the ED by walk-in for an evaluation of seizures    The patient presents to the ED for ongoing seizures since 03/03/2023. She reported 2 episodes of seizures with a duration around 45-60 seconds on 03/03 and 3-4 episodes within this weekend. She reports her onset of seizures are associated with panic attacks and anxiety. She was experiencing tremors and urinary incontinence during her seizures and eventually passes out. Upon waking up from her seizures, she reports being confused and takes her a while to regain awareness. She notes having a history of seizures when she was a child, but eventually she \"grew out of it\".  She had a 1 minute lasting seizure at the ED and passed out. She recovered and was able to talk to her  immediately after.     She recently had her upper four frontal teeth removed and reports her treatment is going well. 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 She has a history of thyroid illness. She does visit her psychiatrist and rheumatologist. She is currently taking lamotrigine and ativan.    Otherwise, she denied having fever, chest pain, abdominal pain, naseua, vomiting and any other complaints or concerns at this time.      REVIEW OF SYSTEMS   See HPI, otherwise all other systems reviewed and are negative    PAST MEDICAL HISTORY:  Past Medical History:   Diagnosis Date     Bipolar 1 disorder (H)        PAST SURGICAL HISTORY:  No past surgical history on file.        CURRENT MEDICATIONS:    No current facility-administered medications for this encounter.    Current Outpatient Medications:      LORazepam (ATIVAN) 1 MG tablet, Take 1 tablet (1 mg) by mouth 2 times daily as needed for anxiety, " Disp: 10 tablet, Rfl: 0     sertraline (ZOLOFT) 50 MG tablet, Take 50 mg by mouth daily, Disp: , Rfl:     ALLERGIES:  Allergies   Allergen Reactions     Pertussis Vaccine      Sulfa Drugs        FAMILY HISTORY:  Father has a history of stroke.    SOCIAL HISTORY:   Smoking: non-smoking  Alcohol: History of alcohol use disorder  Illicit drug: She does not partake in illicit drug usage.    Other: None    VITALS:  Vitals:    03/05/23 1703   BP: (!) 178/121   BP Location: Left arm   Patient Position: Semi-Valle's   Cuff Size: Adult Regular   Pulse: 101   Resp: 19   Temp: 98.7  F (37.1  C)   TempSrc: Oral   SpO2: 97%   Weight: 50.8 kg (112 lb)       PHYSICAL EXAM    General Appearance:  Alert, cooperative, no distress, appears stated age  HENT: Normocephalic without obvious deformity, atraumatic. Mucous membranes moist   Eyes: Conjunctiva clear, Lids normal. No discharge.   Respiratory: No distress. Lungs clear to ausculation bilaterally. No wheezes, rhonchi or stridor  Cardiovascular: Regular rate and rhythm, no murmur. Normal cap refill. No peripheral edema  GI: Abdomen soft, nontender  : No CVA tenderness  Musculoskeletal: Moving all extremities. No gross deformities  Integument: Warm, dry, no rashes or lesions  Neurologic: Alert and orientated x3. No focal deficits. CN II-XII intact. 5/5 strength in upper and lower extremities. Normal coordination. No tremors or tongue fasiculations.  Psych: Normal mood and affect        LAB:  Labs Ordered and Resulted from Time of ED Arrival to Time of ED Departure   CBC WITH PLATELETS - Abnormal       Result Value    WBC Count 4.3      RBC Count 3.76 (*)     Hemoglobin 12.8      Hematocrit 36.8      MCV 98      MCH 34.0 (*)     MCHC 34.8      RDW 12.3      Platelet Count 208     MAGNESIUM - Abnormal    Magnesium 1.7 (*)    ROUTINE UA WITH MICROSCOPIC - Abnormal    Color Urine Light Yellow      Appearance Urine Clear      Glucose Urine Negative      Bilirubin Urine Negative       Ketones Urine Negative      Specific Gravity Urine 1.023      Blood Urine 0.03 mg/dL (*)     pH Urine 7.5 (*)     Protein Albumin Urine 10 (*)     Urobilinogen Urine <2.0      Nitrite Urine Negative      Leukocyte Esterase Urine 25 Marilyn/uL (*)     Bacteria Urine Few (*)     Mucus Urine Present (*)     RBC Urine 1      WBC Urine 6 (*)     Squamous Epithelials Urine 10 (*)    COMPREHENSIVE METABOLIC PANEL - Abnormal    Sodium 140      Potassium 3.8      Chloride 106      Carbon Dioxide (CO2) 22      Anion Gap 12      Urea Nitrogen 8      Creatinine 0.59 (*)     Calcium 8.6      Glucose 84      Alkaline Phosphatase 114      AST 85 (*)     ALT 35      Protein Total 7.4      Albumin 3.7      Bilirubin Total 0.5      GFR Estimate >90     ETHYL ALCOHOL LEVEL - Abnormal    Alcohol, Blood 205 (*)    TSH WITH FREE T4 REFLEX - Normal    TSH 0.47     HCG QUALITATIVE URINE - Normal    hCG Urine Qualitative Negative     DRUGS OF ABUSE 1 PANEL, URINE ([facility] ONLY)    Amphetamines Urine Screen Negative      Benzodiazepines Urine Screen Negative      Opiates Urine Screen Negative      PCP Urine Screen Negative      Cannabinoids Urine Screen Negative      Barbiturates Urine Screen Negative      Cocaine Urine Screen Negative      Oxycodone Urine Screen Negative      Creatinine Urine mg/dL 120     LAMOTRIGINE LEVEL   URINE CULTURE       RADIOLOGY:  CT Head w/o Contrast   Final Result   IMPRESSION:   1.  No acute intracranial finding.   2.  If there is concern for a structural intracranial pathology, further assessment with a brain MRI seizure protocol is recommended.              I, Martin Barros, am serving as a scribe to document services personally performed by Nunu Dickey PA-C based on my observation and the provider's statements to me. I, Nunu Dickey PA-C attest that Martin Barros is acting in a scribe capacity, has observed my performance of the services and has documented them in accordance with my direction.    Nunu Dickey PA-C    Emergency Medicine             Nunu Dickey PA-C  03/10/23 1121

## 2023-03-05 NOTE — ED TRIAGE NOTES
Patient presents to the ED with 3-4 seizures in the last couple days. History of seizures as a child. Patient reports 1 seizure last month ago where a paramedic checked her out and advised her to get seen if it happens again. Patient has blacked out during these seizures and wet herself.     Triage Assessment     Row Name 03/05/23 1708       Triage Assessment (Adult)    Airway WDL WDL       Respiratory WDL    Respiratory WDL WDL       Skin Circulation/Temperature WDL    Skin Circulation/Temperature WDL WDL       Cardiac WDL    Cardiac WDL WDL       Peripheral/Neurovascular WDL    Peripheral Neurovascular WDL WDL       Cognitive/Neuro/Behavioral WDL    Cognitive/Neuro/Behavioral WDL WDL

## 2023-03-05 NOTE — ED NOTES
Introduced self to patient. White board updated. Plan of care and length of time discussed. Pain assessed. Will continue to monitor.

## 2023-03-06 NOTE — ED NOTES
Patient received from Iftu RN. Stable at time of handoff, per report. Noted that no PRN seizure response medications are ordered. Pt at CT at this time. To be seen by this RN.

## 2023-03-06 NOTE — ED NOTES
Patient discharged to home as ordered. Stable at time of discharge. No signs of distress. IV removed; catheter tip intact. Patient ambulated out of the unit with all belongings. Verbalized full understanding of teaching. Left with partner.

## 2023-03-06 NOTE — ED NOTES
Pt had witnessed seizer for about 45 second. Repetitive movement  and drooling noted. Patient able to talk to her boyfriend right away once the movement was over.  Patient A&Ox4. Seizer pad placed on the bed. Will continue to monitor.

## 2023-03-06 NOTE — DISCHARGE INSTRUCTIONS
Your work-up today is reassuring.  There are no emergent findings.    Your alcohol level was elevated.    Strongly recommend cutting back on alcohol and making sure you are eating a well-balanced diet.  I would also strongly recommend scheduling a close follow-up with your primary care doctor and your psychiatrist as it seem like these episodes are brought on by more panic and anxiety.    Return to the ER if you have more severe or prolonged episodes.

## 2023-03-07 LAB
BACTERIA UR CULT: NORMAL
LAMOTRIGINE SERPL-MCNC: <0.9 UG/ML